# Patient Record
Sex: FEMALE | Race: BLACK OR AFRICAN AMERICAN | NOT HISPANIC OR LATINO | Employment: OTHER | URBAN - METROPOLITAN AREA
[De-identification: names, ages, dates, MRNs, and addresses within clinical notes are randomized per-mention and may not be internally consistent; named-entity substitution may affect disease eponyms.]

---

## 2017-01-01 ENCOUNTER — APPOINTMENT (OUTPATIENT)
Dept: GENERAL RADIOLOGY | Facility: HOSPITAL | Age: 82
End: 2017-01-01

## 2017-01-01 ENCOUNTER — APPOINTMENT (OUTPATIENT)
Dept: CT IMAGING | Facility: HOSPITAL | Age: 82
End: 2017-01-01

## 2017-01-01 ENCOUNTER — HOSPITAL ENCOUNTER (INPATIENT)
Facility: HOSPITAL | Age: 82
LOS: 1 days | End: 2017-04-05
Attending: EMERGENCY MEDICINE | Admitting: INTERNAL MEDICINE

## 2017-01-01 ENCOUNTER — HOSPITAL ENCOUNTER (INPATIENT)
Facility: HOSPITAL | Age: 82
LOS: 4 days | Discharge: NURSING, REHAB OR LONG-TERM CARE | End: 2017-03-15
Attending: EMERGENCY MEDICINE | Admitting: INTERNAL MEDICINE

## 2017-01-01 VITALS
BODY MASS INDEX: 24.67 KG/M2 | RESPIRATION RATE: 18 BRPM | DIASTOLIC BLOOD PRESSURE: 87 MMHG | OXYGEN SATURATION: 93 % | HEIGHT: 62 IN | TEMPERATURE: 98.6 F | HEART RATE: 79 BPM | SYSTOLIC BLOOD PRESSURE: 147 MMHG | WEIGHT: 134.04 LBS

## 2017-01-01 VITALS
DIASTOLIC BLOOD PRESSURE: 83 MMHG | HEART RATE: 37 BPM | WEIGHT: 150 LBS | RESPIRATION RATE: 31 BRPM | TEMPERATURE: 96 F | BODY MASS INDEX: 22.73 KG/M2 | SYSTOLIC BLOOD PRESSURE: 116 MMHG | HEIGHT: 68 IN | OXYGEN SATURATION: 91 %

## 2017-01-01 DIAGNOSIS — N17.9 ACUTE RENAL FAILURE, UNSPECIFIED ACUTE RENAL FAILURE TYPE (HCC): ICD-10-CM

## 2017-01-01 DIAGNOSIS — F03.90 DEMENTIA WITHOUT BEHAVIORAL DISTURBANCE, UNSPECIFIED DEMENTIA TYPE: ICD-10-CM

## 2017-01-01 DIAGNOSIS — N17.9 ACUTE KIDNEY INJURY (HCC): ICD-10-CM

## 2017-01-01 DIAGNOSIS — J96.00 ACUTE RESPIRATORY FAILURE, UNSPECIFIED WHETHER WITH HYPOXIA OR HYPERCAPNIA (HCC): ICD-10-CM

## 2017-01-01 DIAGNOSIS — I95.9 HYPOTENSION, UNSPECIFIED HYPOTENSION TYPE: ICD-10-CM

## 2017-01-01 DIAGNOSIS — R41.82 ALTERED MENTAL STATUS, UNSPECIFIED ALTERED MENTAL STATUS TYPE: ICD-10-CM

## 2017-01-01 DIAGNOSIS — N39.0 URINARY TRACT INFECTION, SITE UNSPECIFIED: Primary | ICD-10-CM

## 2017-01-01 DIAGNOSIS — S71.101A: ICD-10-CM

## 2017-01-01 DIAGNOSIS — D64.9 ANEMIA, UNSPECIFIED TYPE: ICD-10-CM

## 2017-01-01 DIAGNOSIS — R79.89 ELEVATED LFTS: ICD-10-CM

## 2017-01-01 DIAGNOSIS — A41.9 SEPSIS, DUE TO UNSPECIFIED ORGANISM: ICD-10-CM

## 2017-01-01 DIAGNOSIS — Z74.09 DECREASED AMBULATION STATUS: ICD-10-CM

## 2017-01-01 DIAGNOSIS — A41.9 SEPSIS, DUE TO UNSPECIFIED ORGANISM: Primary | ICD-10-CM

## 2017-01-01 LAB
ABO + RH BLD: NORMAL
ABO + RH BLD: NORMAL
ABO GROUP BLD: NORMAL
ALBUMIN SERPL-MCNC: 2.2 G/DL (ref 3.5–5.2)
ALBUMIN SERPL-MCNC: 3.3 G/DL (ref 3.5–5.2)
ALBUMIN/GLOB SERPL: 0.7 G/DL
ALBUMIN/GLOB SERPL: 1.1 G/DL
ALP SERPL-CCNC: 115 U/L (ref 39–117)
ALP SERPL-CCNC: 89 U/L (ref 39–117)
ALT SERPL W P-5'-P-CCNC: 27 U/L (ref 1–33)
ALT SERPL W P-5'-P-CCNC: 635 U/L (ref 1–33)
ANION GAP SERPL CALCULATED.3IONS-SCNC: 12.9 MMOL/L
ANION GAP SERPL CALCULATED.3IONS-SCNC: 13.2 MMOL/L
ANION GAP SERPL CALCULATED.3IONS-SCNC: 14.7 MMOL/L
ANION GAP SERPL CALCULATED.3IONS-SCNC: 20.6 MMOL/L
ANION GAP SERPL CALCULATED.3IONS-SCNC: 20.8 MMOL/L
ANION GAP SERPL CALCULATED.3IONS-SCNC: 27.4 MMOL/L
ANISOCYTOSIS BLD QL: NORMAL
ARTERIAL PATENCY WRIST A: ABNORMAL
AST SERPL-CCNC: 29 U/L (ref 1–32)
AST SERPL-CCNC: 737 U/L (ref 1–32)
ATMOSPHERIC PRESS: 743.8 MMHG
B PERT DNA SPEC QL NAA+PROBE: NOT DETECTED
BACTERIA SPEC AEROBE CULT: ABNORMAL
BACTERIA SPEC AEROBE CULT: ABNORMAL
BACTERIA SPEC AEROBE CULT: NO GROWTH
BACTERIA UR QL AUTO: ABNORMAL /HPF
BACTERIA UR QL AUTO: ABNORMAL /HPF
BASE EXCESS BLDA CALC-SCNC: -12.5 MMOL/L (ref 0–2)
BASOPHILS # BLD AUTO: 0.02 10*3/MM3 (ref 0–0.2)
BASOPHILS # BLD AUTO: 0.02 10*3/MM3 (ref 0–0.2)
BASOPHILS # BLD AUTO: 0.03 10*3/MM3 (ref 0–0.2)
BASOPHILS NFR BLD AUTO: 0.1 % (ref 0–1.5)
BASOPHILS NFR BLD AUTO: 0.2 % (ref 0–1.5)
BASOPHILS NFR BLD AUTO: 0.2 % (ref 0–1.5)
BDY SITE: ABNORMAL
BH BB BLOOD EXPIRATION DATE: NORMAL
BH BB BLOOD EXPIRATION DATE: NORMAL
BH BB BLOOD TYPE BARCODE: 5100
BH BB BLOOD TYPE BARCODE: 5100
BH BB DISPENSE STATUS: NORMAL
BH BB DISPENSE STATUS: NORMAL
BH BB PRODUCT CODE: NORMAL
BH BB PRODUCT CODE: NORMAL
BH BB UNIT NUMBER: NORMAL
BH BB UNIT NUMBER: NORMAL
BILIRUB SERPL-MCNC: 0.7 MG/DL (ref 0.1–1.2)
BILIRUB SERPL-MCNC: 0.7 MG/DL (ref 0.1–1.2)
BILIRUB UR QL STRIP: NEGATIVE
BILIRUB UR QL STRIP: NEGATIVE
BLD GP AB SCN SERPL QL: NEGATIVE
BUN BLD-MCNC: 15 MG/DL (ref 8–23)
BUN BLD-MCNC: 20 MG/DL (ref 8–23)
BUN BLD-MCNC: 35 MG/DL (ref 8–23)
BUN BLD-MCNC: 36 MG/DL (ref 8–23)
BUN BLD-MCNC: 43 MG/DL (ref 8–23)
BUN BLD-MCNC: 43 MG/DL (ref 8–23)
BUN/CREAT SERPL: 14.6 (ref 7–25)
BUN/CREAT SERPL: 15.2 (ref 7–25)
BUN/CREAT SERPL: 16.9 (ref 7–25)
BUN/CREAT SERPL: 20 (ref 7–25)
BUN/CREAT SERPL: 20.1 (ref 7–25)
BUN/CREAT SERPL: 28.9 (ref 7–25)
BURR CELLS BLD QL SMEAR: NORMAL
C PNEUM DNA NPH QL NAA+NON-PROBE: NOT DETECTED
CALCIUM SPEC-SCNC: 6.6 MG/DL (ref 8.6–10.5)
CALCIUM SPEC-SCNC: 8 MG/DL (ref 8.6–10.5)
CALCIUM SPEC-SCNC: 8.4 MG/DL (ref 8.6–10.5)
CALCIUM SPEC-SCNC: 8.6 MG/DL (ref 8.6–10.5)
CALCIUM SPEC-SCNC: 8.7 MG/DL (ref 8.6–10.5)
CALCIUM SPEC-SCNC: 9.3 MG/DL (ref 8.6–10.5)
CHLORIDE SERPL-SCNC: 103 MMOL/L (ref 98–107)
CHLORIDE SERPL-SCNC: 105 MMOL/L (ref 98–107)
CHLORIDE SERPL-SCNC: 105 MMOL/L (ref 98–107)
CHLORIDE SERPL-SCNC: 114 MMOL/L (ref 98–107)
CHLORIDE SERPL-SCNC: 117 MMOL/L (ref 98–107)
CHLORIDE SERPL-SCNC: 96 MMOL/L (ref 98–107)
CK SERPL-CCNC: 239 U/L (ref 20–180)
CLARITY UR: ABNORMAL
CLARITY UR: ABNORMAL
CLUMPED PLATELETS: PRESENT
CO2 SERPL-SCNC: 11.4 MMOL/L (ref 22–29)
CO2 SERPL-SCNC: 18.3 MMOL/L (ref 22–29)
CO2 SERPL-SCNC: 19.2 MMOL/L (ref 22–29)
CO2 SERPL-SCNC: 20.1 MMOL/L (ref 22–29)
CO2 SERPL-SCNC: 21.8 MMOL/L (ref 22–29)
CO2 SERPL-SCNC: 6.6 MMOL/L (ref 22–29)
COLOR UR: ABNORMAL
COLOR UR: ABNORMAL
CREAT BLD-MCNC: 0.89 MG/DL (ref 0.57–1)
CREAT BLD-MCNC: 1 MG/DL (ref 0.57–1)
CREAT BLD-MCNC: 1.21 MG/DL (ref 0.57–1)
CREAT BLD-MCNC: 2.14 MG/DL (ref 0.57–1)
CREAT BLD-MCNC: 2.46 MG/DL (ref 0.57–1)
CREAT BLD-MCNC: 2.82 MG/DL (ref 0.57–1)
CROSSMATCH INTERPRETATION: NORMAL
CROSSMATCH INTERPRETATION: NORMAL
D-LACTATE SERPL-SCNC: 2.5 MMOL/L (ref 0.5–2)
D-LACTATE SERPL-SCNC: 2.6 MMOL/L (ref 0.5–2)
D-LACTATE SERPL-SCNC: 6.8 MMOL/L (ref 0.5–2)
D-LACTATE SERPL-SCNC: 9.1 MMOL/L (ref 0.5–2)
DEPRECATED RDW RBC AUTO: 51 FL (ref 37–54)
DEPRECATED RDW RBC AUTO: 53.7 FL (ref 37–54)
DEPRECATED RDW RBC AUTO: 54.4 FL (ref 37–54)
DEPRECATED RDW RBC AUTO: 60.1 FL (ref 37–54)
EOSINOPHIL # BLD AUTO: 0.01 10*3/MM3 (ref 0–0.7)
EOSINOPHIL # BLD AUTO: 0.06 10*3/MM3 (ref 0–0.7)
EOSINOPHIL # BLD AUTO: 0.12 10*3/MM3 (ref 0–0.7)
EOSINOPHIL NFR BLD AUTO: 0.1 % (ref 0.3–6.2)
EOSINOPHIL NFR BLD AUTO: 0.5 % (ref 0.3–6.2)
EOSINOPHIL NFR BLD AUTO: 1.3 % (ref 0.3–6.2)
ERYTHROCYTE [DISTWIDTH] IN BLOOD BY AUTOMATED COUNT: 15.5 % (ref 11.7–13)
ERYTHROCYTE [DISTWIDTH] IN BLOOD BY AUTOMATED COUNT: 15.6 % (ref 11.7–13)
ERYTHROCYTE [DISTWIDTH] IN BLOOD BY AUTOMATED COUNT: 15.9 % (ref 11.7–13)
ERYTHROCYTE [DISTWIDTH] IN BLOOD BY AUTOMATED COUNT: 16.3 % (ref 11.7–13)
FERRITIN SERPL-MCNC: 353.7 NG/ML (ref 13–150)
FLUAV H1 2009 PAND RNA NPH QL NAA+PROBE: NOT DETECTED
FLUAV H1 HA GENE NPH QL NAA+PROBE: NOT DETECTED
FLUAV H3 RNA NPH QL NAA+PROBE: NOT DETECTED
FLUAV SUBTYP SPEC NAA+PROBE: NOT DETECTED
FLUBV RNA ISLT QL NAA+PROBE: NOT DETECTED
FOLATE SERPL-MCNC: 10.59 NG/ML (ref 4.78–24.2)
GFR SERPL CREATININE-BSD FRML MDRD: 19 ML/MIN/1.73
GFR SERPL CREATININE-BSD FRML MDRD: 23 ML/MIN/1.73
GFR SERPL CREATININE-BSD FRML MDRD: 27 ML/MIN/1.73
GFR SERPL CREATININE-BSD FRML MDRD: 52 ML/MIN/1.73
GFR SERPL CREATININE-BSD FRML MDRD: 64 ML/MIN/1.73
GFR SERPL CREATININE-BSD FRML MDRD: 73 ML/MIN/1.73
GLOBULIN UR ELPH-MCNC: 3 GM/DL
GLOBULIN UR ELPH-MCNC: 3.2 GM/DL
GLUCOSE BLD-MCNC: 124 MG/DL (ref 65–99)
GLUCOSE BLD-MCNC: 125 MG/DL (ref 65–99)
GLUCOSE BLD-MCNC: 138 MG/DL (ref 65–99)
GLUCOSE BLD-MCNC: 178 MG/DL (ref 65–99)
GLUCOSE BLD-MCNC: 233 MG/DL (ref 65–99)
GLUCOSE BLD-MCNC: 285 MG/DL (ref 65–99)
GLUCOSE BLDC GLUCOMTR-MCNC: 106 MG/DL (ref 70–130)
GLUCOSE BLDC GLUCOMTR-MCNC: 111 MG/DL (ref 70–130)
GLUCOSE BLDC GLUCOMTR-MCNC: 117 MG/DL (ref 70–130)
GLUCOSE BLDC GLUCOMTR-MCNC: 118 MG/DL (ref 70–130)
GLUCOSE BLDC GLUCOMTR-MCNC: 120 MG/DL (ref 70–130)
GLUCOSE BLDC GLUCOMTR-MCNC: 131 MG/DL (ref 70–130)
GLUCOSE BLDC GLUCOMTR-MCNC: 133 MG/DL (ref 70–130)
GLUCOSE BLDC GLUCOMTR-MCNC: 147 MG/DL (ref 70–130)
GLUCOSE BLDC GLUCOMTR-MCNC: 150 MG/DL (ref 70–130)
GLUCOSE BLDC GLUCOMTR-MCNC: 150 MG/DL (ref 70–130)
GLUCOSE BLDC GLUCOMTR-MCNC: 158 MG/DL (ref 70–130)
GLUCOSE BLDC GLUCOMTR-MCNC: 173 MG/DL (ref 70–130)
GLUCOSE BLDC GLUCOMTR-MCNC: 185 MG/DL (ref 70–130)
GLUCOSE BLDC GLUCOMTR-MCNC: 188 MG/DL (ref 70–130)
GLUCOSE BLDC GLUCOMTR-MCNC: 93 MG/DL (ref 70–130)
GLUCOSE BLDC GLUCOMTR-MCNC: 99 MG/DL (ref 70–130)
GLUCOSE UR STRIP-MCNC: NEGATIVE MG/DL
GLUCOSE UR STRIP-MCNC: NEGATIVE MG/DL
GRAM STN SPEC: ABNORMAL
GRAM STN SPEC: ABNORMAL
HADV DNA SPEC NAA+PROBE: NOT DETECTED
HBA1C MFR BLD: 6.04 % (ref 4.8–5.6)
HCO3 BLDA-SCNC: 12.7 MMOL/L (ref 22–28)
HCOV 229E RNA SPEC QL NAA+PROBE: NOT DETECTED
HCOV HKU1 RNA SPEC QL NAA+PROBE: NOT DETECTED
HCOV NL63 RNA SPEC QL NAA+PROBE: NOT DETECTED
HCOV OC43 RNA SPEC QL NAA+PROBE: NOT DETECTED
HCT VFR BLD AUTO: 23.7 % (ref 35.6–45.5)
HCT VFR BLD AUTO: 27.3 % (ref 35.6–45.5)
HCT VFR BLD AUTO: 27.8 % (ref 35.6–45.5)
HCT VFR BLD AUTO: 32.1 % (ref 35.6–45.5)
HGB BLD-MCNC: 10.6 G/DL (ref 11.9–15.5)
HGB BLD-MCNC: 6.7 G/DL (ref 11.9–15.5)
HGB BLD-MCNC: 8.5 G/DL (ref 11.9–15.5)
HGB BLD-MCNC: 8.9 G/DL (ref 11.9–15.5)
HGB UR QL STRIP.AUTO: ABNORMAL
HGB UR QL STRIP.AUTO: ABNORMAL
HMPV RNA NPH QL NAA+NON-PROBE: NOT DETECTED
HOROWITZ INDEX BLD+IHG-RTO: 100 %
HPIV1 RNA SPEC QL NAA+PROBE: NOT DETECTED
HPIV2 RNA SPEC QL NAA+PROBE: NOT DETECTED
HPIV3 RNA NPH QL NAA+PROBE: NOT DETECTED
HPIV4 P GENE NPH QL NAA+PROBE: NOT DETECTED
HYALINE CASTS UR QL AUTO: ABNORMAL /LPF
HYALINE CASTS UR QL AUTO: ABNORMAL /LPF
IMM GRANULOCYTES # BLD: 0.1 10*3/MM3 (ref 0–0.03)
IMM GRANULOCYTES # BLD: 0.32 10*3/MM3 (ref 0–0.03)
IMM GRANULOCYTES # BLD: 1 10*3/MM3 (ref 0–0.03)
IMM GRANULOCYTES NFR BLD: 1.1 % (ref 0–0.5)
IMM GRANULOCYTES NFR BLD: 2.5 % (ref 0–0.5)
IMM GRANULOCYTES NFR BLD: 5.5 % (ref 0–0.5)
INR PPP: 1.24 (ref 0.9–1.1)
IRON 24H UR-MRATE: 16 MCG/DL (ref 37–145)
IRON SATN MFR SERPL: 15 % (ref 20–50)
KETONES UR QL STRIP: ABNORMAL
KETONES UR QL STRIP: ABNORMAL
LEUKOCYTE ESTERASE UR QL STRIP.AUTO: ABNORMAL
LEUKOCYTE ESTERASE UR QL STRIP.AUTO: ABNORMAL
LYMPHOCYTES # BLD AUTO: 1.14 10*3/MM3 (ref 0.9–4.8)
LYMPHOCYTES # BLD AUTO: 1.17 10*3/MM3 (ref 0.9–4.8)
LYMPHOCYTES # BLD AUTO: 1.29 10*3/MM3 (ref 0.9–4.8)
LYMPHOCYTES NFR BLD AUTO: 10.1 % (ref 19.6–45.3)
LYMPHOCYTES NFR BLD AUTO: 12.8 % (ref 19.6–45.3)
LYMPHOCYTES NFR BLD AUTO: 6.5 % (ref 19.6–45.3)
M PNEUMO IGG SER IA-ACNC: NOT DETECTED
MACROCYTES BLD QL SMEAR: NORMAL
MAGNESIUM SERPL-MCNC: 2.1 MG/DL (ref 1.6–2.4)
MCH RBC QN AUTO: 28.6 PG (ref 26.9–32)
MCH RBC QN AUTO: 29.3 PG (ref 26.9–32)
MCH RBC QN AUTO: 29.4 PG (ref 26.9–32)
MCH RBC QN AUTO: 30.3 PG (ref 26.9–32)
MCHC RBC AUTO-ENTMCNC: 28.3 G/DL (ref 32.4–36.3)
MCHC RBC AUTO-ENTMCNC: 30.6 G/DL (ref 32.4–36.3)
MCHC RBC AUTO-ENTMCNC: 32.6 G/DL (ref 32.4–36.3)
MCHC RBC AUTO-ENTMCNC: 33 G/DL (ref 32.4–36.3)
MCV RBC AUTO: 101.3 FL (ref 80.5–98.2)
MCV RBC AUTO: 88.9 FL (ref 80.5–98.2)
MCV RBC AUTO: 92.9 FL (ref 80.5–98.2)
MCV RBC AUTO: 95.9 FL (ref 80.5–98.2)
MODALITY: ABNORMAL
MONOCYTES # BLD AUTO: 0.64 10*3/MM3 (ref 0.2–1.2)
MONOCYTES # BLD AUTO: 0.68 10*3/MM3 (ref 0.2–1.2)
MONOCYTES # BLD AUTO: 0.75 10*3/MM3 (ref 0.2–1.2)
MONOCYTES NFR BLD AUTO: 3.8 % (ref 5–12)
MONOCYTES NFR BLD AUTO: 5 % (ref 5–12)
MONOCYTES NFR BLD AUTO: 8.4 % (ref 5–12)
NEUTROPHILS # BLD AUTO: 10.42 10*3/MM3 (ref 1.9–8.1)
NEUTROPHILS # BLD AUTO: 15.14 10*3/MM3 (ref 1.9–8.1)
NEUTROPHILS # BLD AUTO: 6.76 10*3/MM3 (ref 1.9–8.1)
NEUTROPHILS NFR BLD AUTO: 76.2 % (ref 42.7–76)
NEUTROPHILS NFR BLD AUTO: 81.7 % (ref 42.7–76)
NEUTROPHILS NFR BLD AUTO: 84 % (ref 42.7–76)
NITRITE UR QL STRIP: NEGATIVE
NITRITE UR QL STRIP: NEGATIVE
NRBC BLD MANUAL-RTO: 0 /100 WBC (ref 0–0)
NRBC BLD MANUAL-RTO: 0.1 /100 WBC (ref 0–0)
NT-PROBNP SERPL-MCNC: 2096 PG/ML (ref 0–1800)
O2 A-A PPRESDIFF RESPIRATORY: 0.8 MMHG
PCO2 BLDA: 26.4 MM HG (ref 35–45)
PEEP RESPIRATORY: 5 CM[H2O]
PH BLDA: 7.29 PH UNITS (ref 7.35–7.45)
PH UR STRIP.AUTO: 5.5 [PH] (ref 5–8)
PH UR STRIP.AUTO: <=5 [PH] (ref 5–8)
PLAT MORPH BLD: NORMAL
PLAT MORPH BLD: NORMAL
PLATELET # BLD AUTO: 357 10*3/MM3 (ref 140–500)
PLATELET # BLD AUTO: 370 10*3/MM3 (ref 140–500)
PLATELET # BLD AUTO: 438 10*3/MM3 (ref 140–500)
PLATELET # BLD AUTO: 469 10*3/MM3 (ref 140–500)
PMV BLD AUTO: 9.5 FL (ref 6–12)
PMV BLD AUTO: 9.5 FL (ref 6–12)
PMV BLD AUTO: 9.6 FL (ref 6–12)
PMV BLD AUTO: 9.6 FL (ref 6–12)
PO2 BLDA: 562.3 MM HG (ref 80–100)
POTASSIUM BLD-SCNC: 4 MMOL/L (ref 3.5–5.2)
POTASSIUM BLD-SCNC: 4.2 MMOL/L (ref 3.5–5.2)
POTASSIUM BLD-SCNC: 4.6 MMOL/L (ref 3.5–5.2)
POTASSIUM BLD-SCNC: 5.2 MMOL/L (ref 3.5–5.2)
POTASSIUM BLD-SCNC: 5.3 MMOL/L (ref 3.5–5.2)
POTASSIUM BLD-SCNC: 5.3 MMOL/L (ref 3.5–5.2)
PROCALCITONIN SERPL-MCNC: 0.96 NG/ML (ref 0.1–0.25)
PROT SERPL-MCNC: 5.4 G/DL (ref 6–8.5)
PROT SERPL-MCNC: 6.3 G/DL (ref 6–8.5)
PROT UR QL STRIP: ABNORMAL
PROT UR QL STRIP: ABNORMAL
PROTHROMBIN TIME: 15.1 SECONDS (ref 11.7–14.2)
RBC # BLD AUTO: 2.34 10*6/MM3 (ref 3.9–5.2)
RBC # BLD AUTO: 2.9 10*6/MM3 (ref 3.9–5.2)
RBC # BLD AUTO: 2.94 10*6/MM3 (ref 3.9–5.2)
RBC # BLD AUTO: 3.61 10*6/MM3 (ref 3.9–5.2)
RBC # UR: ABNORMAL /HPF
RBC # UR: ABNORMAL /HPF
REF LAB TEST METHOD: ABNORMAL
REF LAB TEST METHOD: ABNORMAL
RH BLD: POSITIVE
RHINOVIRUS RNA SPEC NAA+PROBE: NOT DETECTED
RSV RNA NPH QL NAA+NON-PROBE: NOT DETECTED
SAO2 % BLDCOA: 100 % (ref 92–99)
SCHISTOCYTES BLD QL SMEAR: NORMAL
SET MECH RESP RATE: 12
SODIUM BLD-SCNC: 136 MMOL/L (ref 136–145)
SODIUM BLD-SCNC: 138 MMOL/L (ref 136–145)
SODIUM BLD-SCNC: 148 MMOL/L (ref 136–145)
SODIUM BLD-SCNC: 149 MMOL/L (ref 136–145)
SP GR UR STRIP: 1.02 (ref 1–1.03)
SP GR UR STRIP: 1.02 (ref 1–1.03)
SPHEROCYTES BLD QL SMEAR: NORMAL
SPHEROCYTES BLD QL SMEAR: NORMAL
SQUAMOUS #/AREA URNS HPF: ABNORMAL /HPF
SQUAMOUS #/AREA URNS HPF: ABNORMAL /HPF
TIBC SERPL-MCNC: 107 MCG/DL (ref 298–536)
TOTAL RATE: 17 BREATHS/MINUTE
TRANSFERRIN SERPL-MCNC: 72 MG/DL (ref 200–360)
TROPONIN T SERPL-MCNC: 0.04 NG/ML (ref 0–0.03)
TROPONIN T SERPL-MCNC: 0.04 NG/ML (ref 0–0.03)
TROPONIN T SERPL-MCNC: 0.05 NG/ML (ref 0–0.03)
TROPONIN T SERPL-MCNC: 0.37 NG/ML (ref 0–0.03)
TROPONIN T SERPL-MCNC: 0.41 NG/ML (ref 0–0.03)
TSH SERPL DL<=0.05 MIU/L-ACNC: 8.25 MIU/ML (ref 0.27–4.2)
UNIT  ABO: NORMAL
UNIT  ABO: NORMAL
UNIT  RH: NORMAL
UNIT  RH: NORMAL
UROBILINOGEN UR QL STRIP: ABNORMAL
UROBILINOGEN UR QL STRIP: ABNORMAL
VENTILATOR MODE: ABNORMAL
VIT B12 BLD-MCNC: 196 PG/ML (ref 211–946)
VT ON VENT VENT: 550 ML
WBC MORPH BLD: NORMAL
WBC NRBC COR # BLD: 10.55 10*3/MM3 (ref 4.5–10.7)
WBC NRBC COR # BLD: 12.76 10*3/MM3 (ref 4.5–10.7)
WBC NRBC COR # BLD: 18.02 10*3/MM3 (ref 4.5–10.7)
WBC NRBC COR # BLD: 8.89 10*3/MM3 (ref 4.5–10.7)
WBC UR QL AUTO: ABNORMAL /HPF
WBC UR QL AUTO: ABNORMAL /HPF
YEAST URNS QL MICRO: ABNORMAL /HPF

## 2017-01-01 PROCEDURE — 94799 UNLISTED PULMONARY SVC/PX: CPT

## 2017-01-01 PROCEDURE — 87077 CULTURE AEROBIC IDENTIFY: CPT | Performed by: INTERNAL MEDICINE

## 2017-01-01 PROCEDURE — 83605 ASSAY OF LACTIC ACID: CPT | Performed by: EMERGENCY MEDICINE

## 2017-01-01 PROCEDURE — 83540 ASSAY OF IRON: CPT | Performed by: HOSPITALIST

## 2017-01-01 PROCEDURE — 97110 THERAPEUTIC EXERCISES: CPT

## 2017-01-01 PROCEDURE — 25010000002 CEFTRIAXONE PER 250 MG: Performed by: HOSPITALIST

## 2017-01-01 PROCEDURE — 5A1935Z RESPIRATORY VENTILATION, LESS THAN 24 CONSECUTIVE HOURS: ICD-10-PCS | Performed by: INTERNAL MEDICINE

## 2017-01-01 PROCEDURE — 82746 ASSAY OF FOLIC ACID SERUM: CPT | Performed by: HOSPITALIST

## 2017-01-01 PROCEDURE — 87186 SC STD MICRODIL/AGAR DIL: CPT | Performed by: INTERNAL MEDICINE

## 2017-01-01 PROCEDURE — 25010000003 CEFTRIAXONE PER 250 MG: Performed by: HOSPITALIST

## 2017-01-01 PROCEDURE — 84443 ASSAY THYROID STIM HORMONE: CPT | Performed by: HOSPITALIST

## 2017-01-01 PROCEDURE — 25010000002 FENTANYL CITRATE (PF) 100 MCG/2ML SOLUTION: Performed by: INTERNAL MEDICINE

## 2017-01-01 PROCEDURE — 82962 GLUCOSE BLOOD TEST: CPT

## 2017-01-01 PROCEDURE — 36415 COLL VENOUS BLD VENIPUNCTURE: CPT | Performed by: EMERGENCY MEDICINE

## 2017-01-01 PROCEDURE — 84484 ASSAY OF TROPONIN QUANT: CPT | Performed by: EMERGENCY MEDICINE

## 2017-01-01 PROCEDURE — 05HM33Z INSERTION OF INFUSION DEVICE INTO RIGHT INTERNAL JUGULAR VEIN, PERCUTANEOUS APPROACH: ICD-10-PCS | Performed by: INTERNAL MEDICINE

## 2017-01-01 PROCEDURE — 92526 ORAL FUNCTION THERAPY: CPT

## 2017-01-01 PROCEDURE — 82803 BLOOD GASES ANY COMBINATION: CPT

## 2017-01-01 PROCEDURE — 25010000002 ENOXAPARIN PER 10 MG: Performed by: INTERNAL MEDICINE

## 2017-01-01 PROCEDURE — 63710000001 INSULIN ASPART PER 5 UNITS: Performed by: INTERNAL MEDICINE

## 2017-01-01 PROCEDURE — 81001 URINALYSIS AUTO W/SCOPE: CPT | Performed by: EMERGENCY MEDICINE

## 2017-01-01 PROCEDURE — 94003 VENT MGMT INPAT SUBQ DAY: CPT

## 2017-01-01 PROCEDURE — 83735 ASSAY OF MAGNESIUM: CPT | Performed by: INTERNAL MEDICINE

## 2017-01-01 PROCEDURE — 30233N1 TRANSFUSION OF NONAUTOLOGOUS RED BLOOD CELLS INTO PERIPHERAL VEIN, PERCUTANEOUS APPROACH: ICD-10-PCS | Performed by: EMERGENCY MEDICINE

## 2017-01-01 PROCEDURE — 82607 VITAMIN B-12: CPT | Performed by: HOSPITALIST

## 2017-01-01 PROCEDURE — P9016 RBC LEUKOCYTES REDUCED: HCPCS

## 2017-01-01 PROCEDURE — 82728 ASSAY OF FERRITIN: CPT | Performed by: HOSPITALIST

## 2017-01-01 PROCEDURE — 84466 ASSAY OF TRANSFERRIN: CPT | Performed by: HOSPITALIST

## 2017-01-01 PROCEDURE — 36430 TRANSFUSION BLD/BLD COMPNT: CPT

## 2017-01-01 PROCEDURE — 93010 ELECTROCARDIOGRAM REPORT: CPT | Performed by: INTERNAL MEDICINE

## 2017-01-01 PROCEDURE — 87798 DETECT AGENT NOS DNA AMP: CPT | Performed by: INTERNAL MEDICINE

## 2017-01-01 PROCEDURE — 99291 CRITICAL CARE FIRST HOUR: CPT

## 2017-01-01 PROCEDURE — 80048 BASIC METABOLIC PNL TOTAL CA: CPT | Performed by: HOSPITALIST

## 2017-01-01 PROCEDURE — 71010 HC CHEST PA OR AP: CPT

## 2017-01-01 PROCEDURE — 87040 BLOOD CULTURE FOR BACTERIA: CPT | Performed by: EMERGENCY MEDICINE

## 2017-01-01 PROCEDURE — 80053 COMPREHEN METABOLIC PANEL: CPT | Performed by: EMERGENCY MEDICINE

## 2017-01-01 PROCEDURE — 86920 COMPATIBILITY TEST SPIN: CPT

## 2017-01-01 PROCEDURE — 87086 URINE CULTURE/COLONY COUNT: CPT | Performed by: EMERGENCY MEDICINE

## 2017-01-01 PROCEDURE — 85007 BL SMEAR W/DIFF WBC COUNT: CPT | Performed by: HOSPITALIST

## 2017-01-01 PROCEDURE — 85007 BL SMEAR W/DIFF WBC COUNT: CPT | Performed by: EMERGENCY MEDICINE

## 2017-01-01 PROCEDURE — 25010000002 CYANOCOBALAMIN PER 1000 MCG: Performed by: HOSPITALIST

## 2017-01-01 PROCEDURE — 25010000002 PIPERACILLIN SOD-TAZOBACTAM PER 1 G: Performed by: INTERNAL MEDICINE

## 2017-01-01 PROCEDURE — 83605 ASSAY OF LACTIC ACID: CPT | Performed by: INTERNAL MEDICINE

## 2017-01-01 PROCEDURE — 85027 COMPLETE CBC AUTOMATED: CPT | Performed by: INTERNAL MEDICINE

## 2017-01-01 PROCEDURE — 84484 ASSAY OF TROPONIN QUANT: CPT | Performed by: HOSPITALIST

## 2017-01-01 PROCEDURE — 93005 ELECTROCARDIOGRAM TRACING: CPT | Performed by: EMERGENCY MEDICINE

## 2017-01-01 PROCEDURE — 99232 SBSQ HOSP IP/OBS MODERATE 35: CPT | Performed by: PSYCHIATRY & NEUROLOGY

## 2017-01-01 PROCEDURE — 87633 RESP VIRUS 12-25 TARGETS: CPT | Performed by: INTERNAL MEDICINE

## 2017-01-01 PROCEDURE — 99285 EMERGENCY DEPT VISIT HI MDM: CPT

## 2017-01-01 PROCEDURE — 80048 BASIC METABOLIC PNL TOTAL CA: CPT | Performed by: INTERNAL MEDICINE

## 2017-01-01 PROCEDURE — 86850 RBC ANTIBODY SCREEN: CPT | Performed by: EMERGENCY MEDICINE

## 2017-01-01 PROCEDURE — 87205 SMEAR GRAM STAIN: CPT | Performed by: INTERNAL MEDICINE

## 2017-01-01 PROCEDURE — 97162 PT EVAL MOD COMPLEX 30 MIN: CPT

## 2017-01-01 PROCEDURE — 86900 BLOOD TYPING SEROLOGIC ABO: CPT

## 2017-01-01 PROCEDURE — 87486 CHLMYD PNEUM DNA AMP PROBE: CPT | Performed by: INTERNAL MEDICINE

## 2017-01-01 PROCEDURE — B543ZZA ULTRASONOGRAPHY OF RIGHT JUGULAR VEINS, GUIDANCE: ICD-10-PCS | Performed by: INTERNAL MEDICINE

## 2017-01-01 PROCEDURE — P9612 CATHETERIZE FOR URINE SPEC: HCPCS

## 2017-01-01 PROCEDURE — 87070 CULTURE OTHR SPECIMN AEROBIC: CPT | Performed by: INTERNAL MEDICINE

## 2017-01-01 PROCEDURE — 83036 HEMOGLOBIN GLYCOSYLATED A1C: CPT | Performed by: INTERNAL MEDICINE

## 2017-01-01 PROCEDURE — 93005 ELECTROCARDIOGRAM TRACING: CPT | Performed by: INTERNAL MEDICINE

## 2017-01-01 PROCEDURE — 92610 EVALUATE SWALLOWING FUNCTION: CPT

## 2017-01-01 PROCEDURE — 84145 PROCALCITONIN (PCT): CPT | Performed by: EMERGENCY MEDICINE

## 2017-01-01 PROCEDURE — 25010000003 CEFTRIAXONE PER 250 MG: Performed by: EMERGENCY MEDICINE

## 2017-01-01 PROCEDURE — 85025 COMPLETE CBC W/AUTO DIFF WBC: CPT | Performed by: EMERGENCY MEDICINE

## 2017-01-01 PROCEDURE — 85610 PROTHROMBIN TIME: CPT | Performed by: EMERGENCY MEDICINE

## 2017-01-01 PROCEDURE — 25010000002 PIPERACILLIN SOD-TAZOBACTAM PER 1 G: Performed by: EMERGENCY MEDICINE

## 2017-01-01 PROCEDURE — 86901 BLOOD TYPING SEROLOGIC RH(D): CPT | Performed by: EMERGENCY MEDICINE

## 2017-01-01 PROCEDURE — 86901 BLOOD TYPING SEROLOGIC RH(D): CPT

## 2017-01-01 PROCEDURE — 94002 VENT MGMT INPAT INIT DAY: CPT

## 2017-01-01 PROCEDURE — 84484 ASSAY OF TROPONIN QUANT: CPT | Performed by: INTERNAL MEDICINE

## 2017-01-01 PROCEDURE — 85025 COMPLETE CBC W/AUTO DIFF WBC: CPT | Performed by: HOSPITALIST

## 2017-01-01 PROCEDURE — 70450 CT HEAD/BRAIN W/O DYE: CPT

## 2017-01-01 PROCEDURE — 25010000002 PROPOFOL 1000 MG/ML EMULSION: Performed by: INTERNAL MEDICINE

## 2017-01-01 PROCEDURE — 99292 CRITICAL CARE ADDL 30 MIN: CPT

## 2017-01-01 PROCEDURE — 25010000002 VANCOMYCIN: Performed by: INTERNAL MEDICINE

## 2017-01-01 PROCEDURE — 36600 WITHDRAWAL OF ARTERIAL BLOOD: CPT

## 2017-01-01 PROCEDURE — 86900 BLOOD TYPING SEROLOGIC ABO: CPT | Performed by: EMERGENCY MEDICINE

## 2017-01-01 PROCEDURE — 87581 M.PNEUMON DNA AMP PROBE: CPT | Performed by: INTERNAL MEDICINE

## 2017-01-01 PROCEDURE — 81001 URINALYSIS AUTO W/SCOPE: CPT | Performed by: INTERNAL MEDICINE

## 2017-01-01 PROCEDURE — 83880 ASSAY OF NATRIURETIC PEPTIDE: CPT | Performed by: EMERGENCY MEDICINE

## 2017-01-01 PROCEDURE — 82550 ASSAY OF CK (CPK): CPT | Performed by: EMERGENCY MEDICINE

## 2017-01-01 RX ORDER — ONDANSETRON 2 MG/ML
4 INJECTION INTRAMUSCULAR; INTRAVENOUS EVERY 6 HOURS PRN
Status: DISCONTINUED | OUTPATIENT
Start: 2017-01-01 | End: 2017-01-01 | Stop reason: HOSPADM

## 2017-01-01 RX ORDER — FAMOTIDINE 20 MG/1
20 TABLET, FILM COATED ORAL 2 TIMES DAILY
Status: DISCONTINUED | OUTPATIENT
Start: 2017-01-01 | End: 2017-01-01 | Stop reason: HOSPADM

## 2017-01-01 RX ORDER — HYDROCODONE BITARTRATE AND ACETAMINOPHEN 5; 325 MG/1; MG/1
2 TABLET ORAL EVERY 4 HOURS PRN
Status: DISCONTINUED | OUTPATIENT
Start: 2017-01-01 | End: 2017-01-01

## 2017-01-01 RX ORDER — HYDROMORPHONE HYDROCHLORIDE 1 MG/ML
0.5 INJECTION, SOLUTION INTRAMUSCULAR; INTRAVENOUS; SUBCUTANEOUS ONCE
Status: DISCONTINUED | OUTPATIENT
Start: 2017-01-01 | End: 2017-01-01

## 2017-01-01 RX ORDER — BISACODYL 5 MG/1
5 TABLET, DELAYED RELEASE ORAL DAILY PRN
Status: DISCONTINUED | OUTPATIENT
Start: 2017-01-01 | End: 2017-01-01 | Stop reason: HOSPADM

## 2017-01-01 RX ORDER — FUROSEMIDE 20 MG/1
20 TABLET ORAL DAILY
COMMUNITY
End: 2017-01-01 | Stop reason: HOSPADM

## 2017-01-01 RX ORDER — ATORVASTATIN CALCIUM 40 MG/1
40 TABLET, FILM COATED ORAL DAILY
Status: DISCONTINUED | OUTPATIENT
Start: 2017-01-01 | End: 2017-01-01 | Stop reason: HOSPADM

## 2017-01-01 RX ORDER — LIDOCAINE HYDROCHLORIDE 10 MG/ML
5 INJECTION, SOLUTION INFILTRATION; PERINEURAL EVERY 24 HOURS
Status: DISCONTINUED | OUTPATIENT
Start: 2017-01-01 | End: 2017-01-01

## 2017-01-01 RX ORDER — LEVOTHYROXINE SODIUM 88 UG/1
88 TABLET ORAL
Status: DISCONTINUED | OUTPATIENT
Start: 2017-01-01 | End: 2017-01-01 | Stop reason: HOSPADM

## 2017-01-01 RX ORDER — NAPROXEN 500 MG/1
500 TABLET ORAL
COMMUNITY

## 2017-01-01 RX ORDER — NITROGLYCERIN 0.4 MG/1
0.4 TABLET SUBLINGUAL
Status: DISCONTINUED | OUTPATIENT
Start: 2017-01-01 | End: 2017-01-01 | Stop reason: HOSPADM

## 2017-01-01 RX ORDER — CALCIUM CARBONATE 200(500)MG
2 TABLET,CHEWABLE ORAL 2 TIMES DAILY PRN
Status: DISCONTINUED | OUTPATIENT
Start: 2017-01-01 | End: 2017-01-01 | Stop reason: HOSPADM

## 2017-01-01 RX ORDER — DEXTROSE MONOHYDRATE 25 G/50ML
25 INJECTION, SOLUTION INTRAVENOUS
Status: DISCONTINUED | OUTPATIENT
Start: 2017-01-01 | End: 2017-01-01 | Stop reason: HOSPADM

## 2017-01-01 RX ORDER — CEFTRIAXONE 2 G/1
2 INJECTION, POWDER, FOR SOLUTION INTRAMUSCULAR; INTRAVENOUS EVERY 24 HOURS
Status: DISCONTINUED | OUTPATIENT
Start: 2017-01-01 | End: 2017-01-01

## 2017-01-01 RX ORDER — NITROGLYCERIN 0.4 MG/1
0.4 TABLET SUBLINGUAL
COMMUNITY

## 2017-01-01 RX ORDER — LIDOCAINE HYDROCHLORIDE 10 MG/ML
4.2 INJECTION, SOLUTION INFILTRATION; PERINEURAL EVERY 24 HOURS
Status: DISCONTINUED | OUTPATIENT
Start: 2017-01-01 | End: 2017-01-01

## 2017-01-01 RX ORDER — GLIPIZIDE 5 MG/1
2.5 TABLET ORAL DAILY
COMMUNITY
End: 2017-01-01 | Stop reason: HOSPADM

## 2017-01-01 RX ORDER — FERROUS SULFATE 325(65) MG
325 TABLET ORAL
COMMUNITY

## 2017-01-01 RX ORDER — SACCHAROMYCES BOULARDII 250 MG
250 CAPSULE ORAL 2 TIMES DAILY
Status: DISCONTINUED | OUTPATIENT
Start: 2017-01-01 | End: 2017-01-01 | Stop reason: HOSPADM

## 2017-01-01 RX ORDER — MAGNESIUM HYDROXIDE/ALUMINUM HYDROXICE/SIMETHICONE 120; 1200; 1200 MG/30ML; MG/30ML; MG/30ML
30 SUSPENSION ORAL EVERY 6 HOURS PRN
Status: DISCONTINUED | OUTPATIENT
Start: 2017-01-01 | End: 2017-01-01 | Stop reason: HOSPADM

## 2017-01-01 RX ORDER — CARVEDILOL 12.5 MG/1
12.5 TABLET ORAL 2 TIMES DAILY WITH MEALS
Status: DISCONTINUED | OUTPATIENT
Start: 2017-01-01 | End: 2017-01-01 | Stop reason: HOSPADM

## 2017-01-01 RX ORDER — SODIUM CHLORIDE 9 MG/ML
1000 INJECTION, SOLUTION INTRAVENOUS ONCE
Status: COMPLETED | OUTPATIENT
Start: 2017-01-01 | End: 2017-01-01

## 2017-01-01 RX ORDER — GLIPIZIDE 5 MG/1
2.5 TABLET ORAL
COMMUNITY

## 2017-01-01 RX ORDER — HYOSCYAMINE SULFATE 16 OZ
1 SOLUTION MISCELLANEOUS
COMMUNITY

## 2017-01-01 RX ORDER — HYDROCODONE BITARTRATE AND ACETAMINOPHEN 5; 325 MG/1; MG/1
1 TABLET ORAL EVERY 6 HOURS PRN
COMMUNITY

## 2017-01-01 RX ORDER — FUROSEMIDE 20 MG/1
20 TABLET ORAL
COMMUNITY

## 2017-01-01 RX ORDER — NICOTINE POLACRILEX 4 MG
15 LOZENGE BUCCAL
Status: DISCONTINUED | OUTPATIENT
Start: 2017-01-01 | End: 2017-01-01 | Stop reason: HOSPADM

## 2017-01-01 RX ORDER — CLOPIDOGREL BISULFATE 75 MG/1
75 TABLET ORAL DAILY
COMMUNITY

## 2017-01-01 RX ORDER — CLOPIDOGREL BISULFATE 75 MG/1
75 TABLET ORAL DAILY
Status: DISCONTINUED | OUTPATIENT
Start: 2017-01-01 | End: 2017-01-01 | Stop reason: HOSPADM

## 2017-01-01 RX ORDER — ONDANSETRON 4 MG/1
4 TABLET, ORALLY DISINTEGRATING ORAL EVERY 6 HOURS PRN
Status: DISCONTINUED | OUTPATIENT
Start: 2017-01-01 | End: 2017-01-01 | Stop reason: HOSPADM

## 2017-01-01 RX ORDER — SODIUM CHLORIDE 9 MG/ML
250 INJECTION, SOLUTION INTRAVENOUS CONTINUOUS
Status: DISCONTINUED | OUTPATIENT
Start: 2017-01-01 | End: 2017-01-01 | Stop reason: HOSPADM

## 2017-01-01 RX ORDER — ACETAMINOPHEN 325 MG/1
650 TABLET ORAL EVERY 4 HOURS PRN
Refills: 0
Start: 2017-01-01

## 2017-01-01 RX ORDER — ASPIRIN 81 MG/1
81 TABLET, CHEWABLE ORAL DAILY
Status: DISCONTINUED | OUTPATIENT
Start: 2017-01-01 | End: 2017-01-01 | Stop reason: HOSPADM

## 2017-01-01 RX ORDER — ONDANSETRON 4 MG/1
4 TABLET, FILM COATED ORAL EVERY 6 HOURS PRN
Status: DISCONTINUED | OUTPATIENT
Start: 2017-01-01 | End: 2017-01-01 | Stop reason: HOSPADM

## 2017-01-01 RX ORDER — BISACODYL 10 MG
10 SUPPOSITORY, RECTAL RECTAL DAILY PRN
Status: DISCONTINUED | OUTPATIENT
Start: 2017-01-01 | End: 2017-01-01 | Stop reason: HOSPADM

## 2017-01-01 RX ORDER — NAPROXEN 500 MG/1
500 TABLET ORAL 2 TIMES DAILY PRN
COMMUNITY
End: 2017-01-01 | Stop reason: HOSPADM

## 2017-01-01 RX ORDER — ASPIRIN 81 MG/1
81 TABLET, CHEWABLE ORAL DAILY
COMMUNITY

## 2017-01-01 RX ORDER — IPRATROPIUM BROMIDE AND ALBUTEROL SULFATE 2.5; .5 MG/3ML; MG/3ML
3 SOLUTION RESPIRATORY (INHALATION) EVERY 6 HOURS PRN
Status: DISCONTINUED | OUTPATIENT
Start: 2017-01-01 | End: 2017-01-01 | Stop reason: HOSPADM

## 2017-01-01 RX ORDER — HYDROCODONE BITARTRATE AND ACETAMINOPHEN 5; 325 MG/1; MG/1
1 TABLET ORAL EVERY 6 HOURS PRN
Status: DISCONTINUED | OUTPATIENT
Start: 2017-01-01 | End: 2017-01-01 | Stop reason: HOSPADM

## 2017-01-01 RX ORDER — LEVOTHYROXINE SODIUM 88 UG/1
88 TABLET ORAL EVERY MORNING
COMMUNITY

## 2017-01-01 RX ORDER — CYANOCOBALAMIN 1000 UG/ML
1000 INJECTION, SOLUTION INTRAMUSCULAR; SUBCUTANEOUS DAILY
Status: DISCONTINUED | OUTPATIENT
Start: 2017-01-01 | End: 2017-01-01

## 2017-01-01 RX ORDER — ASCORBIC ACID 500 MG
500 TABLET ORAL DAILY
COMMUNITY

## 2017-01-01 RX ORDER — HYDROCODONE BITARTRATE AND ACETAMINOPHEN 5; 325 MG/1; MG/1
1 TABLET ORAL EVERY 6 HOURS PRN
Status: DISCONTINUED | OUTPATIENT
Start: 2017-01-01 | End: 2017-01-01

## 2017-01-01 RX ORDER — FENTANYL CITRATE 50 UG/ML
50 INJECTION, SOLUTION INTRAMUSCULAR; INTRAVENOUS
Status: DISCONTINUED | OUTPATIENT
Start: 2017-01-01 | End: 2017-01-01 | Stop reason: HOSPADM

## 2017-01-01 RX ORDER — ACETAMINOPHEN 325 MG/1
650 TABLET ORAL EVERY 4 HOURS PRN
Status: DISCONTINUED | OUTPATIENT
Start: 2017-01-01 | End: 2017-01-01 | Stop reason: HOSPADM

## 2017-01-01 RX ORDER — ACETAMINOPHEN 650 MG/1
650 SUPPOSITORY RECTAL EVERY 4 HOURS PRN
Status: DISCONTINUED | OUTPATIENT
Start: 2017-01-01 | End: 2017-01-01 | Stop reason: HOSPADM

## 2017-01-01 RX ORDER — ISOSORBIDE MONONITRATE 60 MG/1
60 TABLET, EXTENDED RELEASE ORAL DAILY
COMMUNITY

## 2017-01-01 RX ORDER — SODIUM CHLORIDE 0.9 % (FLUSH) 0.9 %
1-10 SYRINGE (ML) INJECTION AS NEEDED
Status: DISCONTINUED | OUTPATIENT
Start: 2017-01-01 | End: 2017-01-01 | Stop reason: HOSPADM

## 2017-01-01 RX ORDER — PANTOPRAZOLE SODIUM 40 MG/10ML
40 INJECTION, POWDER, LYOPHILIZED, FOR SOLUTION INTRAVENOUS
Status: DISCONTINUED | OUTPATIENT
Start: 2017-01-01 | End: 2017-01-01 | Stop reason: HOSPADM

## 2017-01-01 RX ORDER — CASTOR OIL AND BALSAM, PERU 788; 87 MG/G; MG/G
1 OINTMENT TOPICAL 2 TIMES DAILY
COMMUNITY

## 2017-01-01 RX ORDER — CHOLECALCIFEROL (VITAMIN D3) 125 MCG
1000 CAPSULE ORAL DAILY
Status: DISCONTINUED | OUTPATIENT
Start: 2017-01-01 | End: 2017-01-01 | Stop reason: HOSPADM

## 2017-01-01 RX ORDER — RANITIDINE 150 MG/1
150 TABLET ORAL 2 TIMES DAILY
COMMUNITY

## 2017-01-01 RX ORDER — CHOLECALCIFEROL (VITAMIN D3) 125 MCG
1000 CAPSULE ORAL DAILY
Status: DISCONTINUED | OUTPATIENT
Start: 2017-01-01 | End: 2017-01-01

## 2017-01-01 RX ORDER — CASTOR OIL AND BALSAM, PERU 788; 87 MG/G; MG/G
1 OINTMENT TOPICAL EVERY 12 HOURS SCHEDULED
Status: DISCONTINUED | OUTPATIENT
Start: 2017-01-01 | End: 2017-01-01 | Stop reason: HOSPADM

## 2017-01-01 RX ORDER — HYOSCYAMINE SULFATE 16 OZ
1 SOLUTION MISCELLANEOUS
Status: DISCONTINUED | OUTPATIENT
Start: 2017-01-01 | End: 2017-01-01 | Stop reason: HOSPADM

## 2017-01-01 RX ORDER — SODIUM CHLORIDE 9 MG/ML
75 INJECTION, SOLUTION INTRAVENOUS CONTINUOUS
Status: DISCONTINUED | OUTPATIENT
Start: 2017-01-01 | End: 2017-01-01

## 2017-01-01 RX ORDER — CEFTRIAXONE SODIUM 2 G/50ML
2 INJECTION, SOLUTION INTRAVENOUS EVERY 24 HOURS
Status: DISCONTINUED | OUTPATIENT
Start: 2017-01-01 | End: 2017-01-01

## 2017-01-01 RX ORDER — CEFTRIAXONE SODIUM 2 G/50ML
2 INJECTION, SOLUTION INTRAVENOUS EVERY 24 HOURS
Status: DISCONTINUED | OUTPATIENT
Start: 2017-01-01 | End: 2017-01-01 | Stop reason: HOSPADM

## 2017-01-01 RX ORDER — ATORVASTATIN CALCIUM 40 MG/1
40 TABLET, FILM COATED ORAL DAILY
COMMUNITY

## 2017-01-01 RX ORDER — CEFTRIAXONE SODIUM 2 G/50ML
2 INJECTION, SOLUTION INTRAVENOUS ONCE
Status: COMPLETED | OUTPATIENT
Start: 2017-01-01 | End: 2017-01-01

## 2017-01-01 RX ORDER — ISOSORBIDE MONONITRATE 60 MG/1
60 TABLET, EXTENDED RELEASE ORAL DAILY
Status: DISCONTINUED | OUTPATIENT
Start: 2017-01-01 | End: 2017-01-01 | Stop reason: HOSPADM

## 2017-01-01 RX ORDER — LISINOPRIL 40 MG/1
40 TABLET ORAL DAILY
COMMUNITY

## 2017-01-01 RX ORDER — CARVEDILOL 12.5 MG/1
12.5 TABLET ORAL 2 TIMES DAILY WITH MEALS
COMMUNITY

## 2017-01-01 RX ADMIN — LIDOCAINE HYDROCHLORIDE 4.2 ML: 10 INJECTION, SOLUTION INFILTRATION; PERINEURAL at 22:04

## 2017-01-01 RX ADMIN — ISOSORBIDE MONONITRATE 60 MG: 60 TABLET, EXTENDED RELEASE ORAL at 09:31

## 2017-01-01 RX ADMIN — FENTANYL CITRATE 50 MCG: 50 INJECTION INTRAMUSCULAR; INTRAVENOUS at 20:31

## 2017-01-01 RX ADMIN — VANCOMYCIN HYDROCHLORIDE 1250 MG: 1 INJECTION, POWDER, LYOPHILIZED, FOR SOLUTION INTRAVENOUS at 17:45

## 2017-01-01 RX ADMIN — Medication 0.2 MCG/KG/MIN: at 17:02

## 2017-01-01 RX ADMIN — CARVEDILOL 12.5 MG: 12.5 TABLET, FILM COATED ORAL at 10:54

## 2017-01-01 RX ADMIN — ASPIRIN 81 MG: 81 TABLET, CHEWABLE ORAL at 10:55

## 2017-01-01 RX ADMIN — ASPIRIN 81 MG: 81 TABLET, CHEWABLE ORAL at 09:31

## 2017-01-01 RX ADMIN — CASTOR OIL AND BALSAM, PERU 1 APPLICATION: 788; 87 OINTMENT TOPICAL at 14:00

## 2017-01-01 RX ADMIN — HYDROCODONE BITARTRATE AND ACETAMINOPHEN 1 TABLET: 5; 325 TABLET ORAL at 01:19

## 2017-01-01 RX ADMIN — HYDROCODONE BITARTRATE AND ACETAMINOPHEN 1 TABLET: 5; 325 TABLET ORAL at 11:28

## 2017-01-01 RX ADMIN — FAMOTIDINE 20 MG: 20 TABLET, FILM COATED ORAL at 18:46

## 2017-01-01 RX ADMIN — SODIUM CHLORIDE 2040 ML: 9 INJECTION, SOLUTION INTRAVENOUS at 07:18

## 2017-01-01 RX ADMIN — FAMOTIDINE 20 MG: 20 TABLET, FILM COATED ORAL at 11:28

## 2017-01-01 RX ADMIN — SODIUM CHLORIDE 125 ML/HR: 9 INJECTION, SOLUTION INTRAVENOUS at 00:41

## 2017-01-01 RX ADMIN — CASTOR OIL AND BALSAM, PERU 1 APPLICATION: 788; 87 OINTMENT TOPICAL at 21:21

## 2017-01-01 RX ADMIN — FAMOTIDINE 20 MG: 20 TABLET, FILM COATED ORAL at 09:30

## 2017-01-01 RX ADMIN — Medication 0.3 MCG/KG/MIN: at 00:28

## 2017-01-01 RX ADMIN — HYOSCYAMINE SULFATE 1 ML: 16 SOLUTION at 18:39

## 2017-01-01 RX ADMIN — FAMOTIDINE 20 MG: 20 TABLET, FILM COATED ORAL at 23:30

## 2017-01-01 RX ADMIN — ATORVASTATIN CALCIUM 40 MG: 40 TABLET, FILM COATED ORAL at 10:54

## 2017-01-01 RX ADMIN — CARVEDILOL 12.5 MG: 12.5 TABLET, FILM COATED ORAL at 23:30

## 2017-01-01 RX ADMIN — FENTANYL CITRATE 50 MCG: 50 INJECTION INTRAMUSCULAR; INTRAVENOUS at 21:39

## 2017-01-01 RX ADMIN — LEVOTHYROXINE SODIUM 88 MCG: 88 TABLET ORAL at 05:51

## 2017-01-01 RX ADMIN — CYANOCOBALAMIN 1000 MCG: 1000 INJECTION, SOLUTION INTRAMUSCULAR at 18:39

## 2017-01-01 RX ADMIN — SODIUM CHLORIDE 250 ML/HR: 9 INJECTION, SOLUTION INTRAVENOUS at 09:36

## 2017-01-01 RX ADMIN — CASTOR OIL AND BALSAM, PERU 1 APPLICATION: 788; 87 OINTMENT TOPICAL at 12:25

## 2017-01-01 RX ADMIN — Medication 0.12 MCG/KG/MIN: at 09:37

## 2017-01-01 RX ADMIN — ISOSORBIDE MONONITRATE 60 MG: 60 TABLET, EXTENDED RELEASE ORAL at 10:45

## 2017-01-01 RX ADMIN — HYOSCYAMINE SULFATE 1 ML: 16 SOLUTION at 09:31

## 2017-01-01 RX ADMIN — SODIUM CHLORIDE 125 ML/HR: 9 INJECTION, SOLUTION INTRAVENOUS at 12:25

## 2017-01-01 RX ADMIN — PROPOFOL 10 MCG/KG/MIN: 10 INJECTION, EMULSION INTRAVENOUS at 16:54

## 2017-01-01 RX ADMIN — CASTOR OIL AND BALSAM, PERU 1 APPLICATION: 788; 87 OINTMENT TOPICAL at 00:49

## 2017-01-01 RX ADMIN — SODIUM CHLORIDE 125 ML/HR: 9 INJECTION, SOLUTION INTRAVENOUS at 17:26

## 2017-01-01 RX ADMIN — PHENYLEPHRINE HYDROCHLORIDE 3 MCG/KG/MIN: 10 INJECTION INTRAVENOUS at 22:31

## 2017-01-01 RX ADMIN — LEVOTHYROXINE SODIUM 88 MCG: 88 TABLET ORAL at 06:01

## 2017-01-01 RX ADMIN — ASPIRIN 81 MG: 81 TABLET, CHEWABLE ORAL at 10:45

## 2017-01-01 RX ADMIN — Medication 250 MG: at 18:40

## 2017-01-01 RX ADMIN — FENTANYL CITRATE 50 MCG: 50 INJECTION INTRAMUSCULAR; INTRAVENOUS at 15:47

## 2017-01-01 RX ADMIN — CARVEDILOL 12.5 MG: 12.5 TABLET, FILM COATED ORAL at 09:31

## 2017-01-01 RX ADMIN — Medication 1000 MCG: at 09:31

## 2017-01-01 RX ADMIN — HYDROCODONE BITARTATE AND ACETAMINOPHEN 2 TABLET: 5; 325 TABLET ORAL at 13:51

## 2017-01-01 RX ADMIN — HYDROCODONE BITARTRATE AND ACETAMINOPHEN 1 TABLET: 5; 325 TABLET ORAL at 10:55

## 2017-01-01 RX ADMIN — CASTOR OIL AND BALSAM, PERU 1 APPLICATION: 788; 87 OINTMENT TOPICAL at 11:28

## 2017-01-01 RX ADMIN — SODIUM CHLORIDE 250 ML/HR: 9 INJECTION, SOLUTION INTRAVENOUS at 17:02

## 2017-01-01 RX ADMIN — ENOXAPARIN SODIUM 30 MG: 30 INJECTION SUBCUTANEOUS at 09:31

## 2017-01-01 RX ADMIN — VASOPRESSIN 0.1 UNITS/MIN: 20 INJECTION INTRAVENOUS at 01:33

## 2017-01-01 RX ADMIN — Medication 0.3 MCG/KG/MIN: at 18:16

## 2017-01-01 RX ADMIN — CASTOR OIL AND BALSAM, PERU 1 APPLICATION: 788; 87 OINTMENT TOPICAL at 21:05

## 2017-01-01 RX ADMIN — LEVOTHYROXINE SODIUM 88 MCG: 88 TABLET ORAL at 05:26

## 2017-01-01 RX ADMIN — ISOSORBIDE MONONITRATE 60 MG: 60 TABLET, EXTENDED RELEASE ORAL at 11:28

## 2017-01-01 RX ADMIN — LEVOTHYROXINE SODIUM 88 MCG: 88 TABLET ORAL at 05:49

## 2017-01-01 RX ADMIN — CARVEDILOL 12.5 MG: 12.5 TABLET, FILM COATED ORAL at 10:44

## 2017-01-01 RX ADMIN — ATORVASTATIN CALCIUM 40 MG: 40 TABLET, FILM COATED ORAL at 09:31

## 2017-01-01 RX ADMIN — HYOSCYAMINE SULFATE 1 ML: 16 SOLUTION at 12:25

## 2017-01-01 RX ADMIN — TAZOBACTAM SODIUM AND PIPERACILLIN SODIUM 4.5 G: 500; 4 INJECTION, SOLUTION INTRAVENOUS at 07:55

## 2017-01-01 RX ADMIN — FAMOTIDINE 20 MG: 20 TABLET, FILM COATED ORAL at 18:02

## 2017-01-01 RX ADMIN — PHENYLEPHRINE HYDROCHLORIDE 1 MCG/KG/MIN: 10 INJECTION INTRAVENOUS at 17:19

## 2017-01-01 RX ADMIN — CARVEDILOL 12.5 MG: 12.5 TABLET, FILM COATED ORAL at 18:39

## 2017-01-01 RX ADMIN — ENOXAPARIN SODIUM 30 MG: 30 INJECTION SUBCUTANEOUS at 10:55

## 2017-01-01 RX ADMIN — ATORVASTATIN CALCIUM 40 MG: 40 TABLET, FILM COATED ORAL at 10:45

## 2017-01-01 RX ADMIN — HYDROCODONE BITARTRATE AND ACETAMINOPHEN 1 TABLET: 5; 325 TABLET ORAL at 10:44

## 2017-01-01 RX ADMIN — CASTOR OIL AND BALSAM, PERU 1 APPLICATION: 788; 87 OINTMENT TOPICAL at 09:31

## 2017-01-01 RX ADMIN — SODIUM CHLORIDE 1000 ML: 9 INJECTION, SOLUTION INTRAVENOUS at 08:33

## 2017-01-01 RX ADMIN — HYDROCODONE BITARTATE AND ACETAMINOPHEN 2 TABLET: 5; 325 TABLET ORAL at 23:30

## 2017-01-01 RX ADMIN — ATORVASTATIN CALCIUM 40 MG: 40 TABLET, FILM COATED ORAL at 11:28

## 2017-01-01 RX ADMIN — HYDROCODONE BITARTATE AND ACETAMINOPHEN 2 TABLET: 5; 325 TABLET ORAL at 10:54

## 2017-01-01 RX ADMIN — HYOSCYAMINE SULFATE 1 ML: 16 SOLUTION at 14:00

## 2017-01-01 RX ADMIN — CLOPIDOGREL 75 MG: 75 TABLET, FILM COATED ORAL at 10:45

## 2017-01-01 RX ADMIN — CEFTRIAXONE SODIUM 2 G: 2 INJECTION, SOLUTION INTRAVENOUS at 15:26

## 2017-01-01 RX ADMIN — TAZOBACTAM SODIUM AND PIPERACILLIN SODIUM 4.5 G: 500; 4 INJECTION, SOLUTION INTRAVENOUS at 20:35

## 2017-01-01 RX ADMIN — CLOPIDOGREL 75 MG: 75 TABLET, FILM COATED ORAL at 09:31

## 2017-01-01 RX ADMIN — CARVEDILOL 12.5 MG: 12.5 TABLET, FILM COATED ORAL at 18:02

## 2017-01-01 RX ADMIN — ENOXAPARIN SODIUM 30 MG: 30 INJECTION SUBCUTANEOUS at 10:44

## 2017-01-01 RX ADMIN — COLLAGENASE SANTYL 1 APPLICATION: 250 OINTMENT TOPICAL at 18:47

## 2017-01-01 RX ADMIN — FENTANYL CITRATE 50 MCG: 50 INJECTION INTRAMUSCULAR; INTRAVENOUS at 16:49

## 2017-01-01 RX ADMIN — Medication 250 MG: at 10:54

## 2017-01-01 RX ADMIN — CYANOCOBALAMIN 1000 MCG: 1000 INJECTION, SOLUTION INTRAMUSCULAR at 11:03

## 2017-01-01 RX ADMIN — INSULIN ASPART 2 UNITS: 100 INJECTION, SOLUTION INTRAVENOUS; SUBCUTANEOUS at 21:21

## 2017-01-01 RX ADMIN — CEFTRIAXONE SODIUM 2 G: 2 INJECTION, POWDER, FOR SOLUTION INTRAMUSCULAR; INTRAVENOUS at 23:30

## 2017-01-01 RX ADMIN — FAMOTIDINE 20 MG: 20 TABLET, FILM COATED ORAL at 10:45

## 2017-01-01 RX ADMIN — CLOPIDOGREL 75 MG: 75 TABLET, FILM COATED ORAL at 11:28

## 2017-01-01 RX ADMIN — CEFTRIAXONE SODIUM 2 G: 2 INJECTION, SOLUTION INTRAVENOUS at 21:05

## 2017-01-01 RX ADMIN — ENOXAPARIN SODIUM 30 MG: 30 INJECTION SUBCUTANEOUS at 10:39

## 2017-01-01 RX ADMIN — ASPIRIN 81 MG: 81 TABLET, CHEWABLE ORAL at 11:28

## 2017-01-01 RX ADMIN — ISOSORBIDE MONONITRATE 60 MG: 60 TABLET, EXTENDED RELEASE ORAL at 10:55

## 2017-01-01 RX ADMIN — CARVEDILOL 12.5 MG: 12.5 TABLET, FILM COATED ORAL at 11:28

## 2017-01-01 RX ADMIN — VASOPRESSIN 0.02 UNITS/MIN: 20 INJECTION INTRAVENOUS at 20:53

## 2017-01-01 RX ADMIN — CEFTRIAXONE SODIUM 2 G: 2 INJECTION, POWDER, FOR SOLUTION INTRAMUSCULAR; INTRAVENOUS at 22:04

## 2017-01-01 RX ADMIN — Medication 0.02 MCG/KG/MIN: at 08:47

## 2017-01-01 RX ADMIN — FAMOTIDINE 20 MG: 20 TABLET, FILM COATED ORAL at 10:54

## 2017-01-01 RX ADMIN — CLOPIDOGREL 75 MG: 75 TABLET, FILM COATED ORAL at 10:55

## 2017-01-01 RX ADMIN — FENTANYL CITRATE 50 MCG: 50 INJECTION INTRAMUSCULAR; INTRAVENOUS at 23:03

## 2017-01-01 RX ADMIN — SODIUM CHLORIDE 1000 ML: 9 INJECTION, SOLUTION INTRAVENOUS at 15:26

## 2017-01-01 RX ADMIN — COLLAGENASE SANTYL 1 APPLICATION: 250 OINTMENT TOPICAL at 09:32

## 2017-03-11 PROBLEM — R00.1 BRADYCARDIA: Status: ACTIVE | Noted: 2017-01-01

## 2017-03-11 PROBLEM — F03.90 DEMENTIA (HCC): Status: ACTIVE | Noted: 2017-01-01

## 2017-03-11 PROBLEM — N39.0 URINARY TRACT INFECTION: Status: ACTIVE | Noted: 2017-01-01

## 2017-03-11 PROBLEM — N17.9 AKI (ACUTE KIDNEY INJURY) (HCC): Status: ACTIVE | Noted: 2017-01-01

## 2017-03-11 PROBLEM — S71.109A OPEN WOUND OF THIGH: Status: ACTIVE | Noted: 2017-01-01

## 2017-03-11 PROBLEM — D64.9 NORMOCYTIC ANEMIA: Status: ACTIVE | Noted: 2017-01-01

## 2017-03-11 PROBLEM — G93.41 METABOLIC ENCEPHALOPATHY: Status: ACTIVE | Noted: 2017-01-01

## 2017-03-11 NOTE — ED PROVIDER NOTES
EMERGENCY DEPARTMENT ENCOUNTER    CHIEF COMPLAINT  Chief Complaint: altered mental status  History given by: EMS, nursing home records  History limited by: pt unresponsive  Room Number: 661/1  PMD: Heath Velasco DO      HPI:  Pt is a 83 y.o. female who presents by EMS from [facility] with decreased responsiveness. EMS was called by [facility] because the patient was found unresponsive. Last time normal unknown, duration of unresponsiveness unknown, and baseline unknown.She was found to be hypotensive en route and was placed on nonrebreather O2. Records reviewed from nursing [facility] show that pt has a hx of HTN, diabetes, and dementia. Hx limited by pt's unresponsiveness.     Duration:  unknown  Onset: unknown  Timing: constant  Quality: unresponsiveness  Intensity/Severity: severe  Progression: unchanged  Associated Symptoms: unknown  Aggravating Factors: unknown  Alleviating Factors: unknown  Previous Episodes: unknown  Treatment before arrival: EMS transport, O2    PAST MEDICAL HISTORY  Active Ambulatory Problems     Diagnosis Date Noted   • Benign essential hypertension 12/13/2013   • Bradycardia 01/12/2017   • Cardiomyopathy 12/13/2013   • Stenosis of carotid artery 12/13/2013   • Diastolic dysfunction 12/13/2013   • Hyperlipidemia 12/13/2013   • Mitral valve insufficiency 12/13/2013   • Normocytic anemia 01/12/2017   • Renal artery stenosis 12/13/2013     Resolved Ambulatory Problems     Diagnosis Date Noted   • No Resolved Ambulatory Problems     Past Medical History   Diagnosis Date   • COPD (chronic obstructive pulmonary disease)    • Dementia 3/11/2017   • Diabetes mellitus    • Hypertension    • Open wound of thigh 1/12/2017   • TIA (transient ischemic attack)    • Type 2 diabetes mellitus 12/13/2013       PAST SURGICAL HISTORY  Past Surgical History   Procedure Laterality Date   • Back surgery         FAMILY HISTORY  Family History   Problem Relation Age of Onset   • Hypertension  Other        SOCIAL HISTORY  Social History     Social History   • Marital status:      Spouse name: N/A   • Number of children: N/A   • Years of education: N/A     Occupational History   • Not on file.     Social History Main Topics   • Smoking status: Former Smoker     Packs/day: 1.00     Years: 15.00     Types: Cigarettes   • Smokeless tobacco: Not on file   • Alcohol use No   • Drug use: No   • Sexual activity: No     Other Topics Concern   • Not on file     Social History Narrative   • No narrative on file       ALLERGIES  Review of patient's allergies indicates no known allergies.    REVIEW OF SYSTEMS  Review of Systems   Unable to perform ROS: Patient unresponsive       PHYSICAL EXAM  ED Triage Vitals   Temp Heart Rate Resp BP SpO2   -- 03/11/17 1309 03/11/17 1309 03/11/17 1309 03/11/17 1312    67 20 105/51 100 %      Temp src Heart Rate Source Patient Position BP Location FiO2 (%)   -- 03/11/17 1309 03/11/17 1309 03/11/17 1309 --    Monitor Lying Right arm        Physical Exam   Constitutional:   Elderly and frail, chronically ill appearing   HENT:   Head: Normocephalic and atraumatic.   Mouth/Throat: Oropharynx is clear and moist.   Eyes: EOM are normal. Pupils are equal, round, and reactive to light.   Neck: Normal range of motion. Neck supple.   Cardiovascular: Normal rate, regular rhythm, normal heart sounds and intact distal pulses.    Pulmonary/Chest: No respiratory distress.   O2 sats 100% on 3L O2   Abdominal: Soft. There is no tenderness.   Musculoskeletal: Normal range of motion. She exhibits no edema.   Neurological: She displays weakness (possible mild right leg).   Pt responds to painful stimuli. Opens and closes eyes, and moves all extremities in response to painful stimuli. Non-verbal   Skin: Skin is warm and dry. No rash noted.   Right medial thigh, wound that is deep through fascia, packed with gauze, Has slight white discharge with some granulation tissue.  no definitive purulent  discharge; appearance of  Chronic Healing infection. . Daughter believes it is improving slowly.         LAB RESULTS  Lab Results (last 24 hours)     Procedure Component Value Units Date/Time    Urinalysis With / Culture If Indicated [[id number]]  (Abnormal) Collected:  03/11/17 1359    Specimen:  Urine from Urine, Catheter Updated:  03/11/17 1420     Color, UA Dark Yellow (A)      Appearance, UA Cloudy (A)      pH, UA 5.5      Specific Gravity, UA 1.020      Glucose, UA Negative      Ketones, UA Trace (A)      Bilirubin, UA Negative      Blood, UA Small (1+) (A)      Protein, UA Trace (A)      Leuk Esterase, UA Large (3+) (A)      Nitrite, UA Negative      Urobilinogen, UA 1.0 E.U./dL     Urinalysis, Microscopic Only [[id number]]  (Abnormal) Collected:  03/11/17 1359    Specimen:  Urine from Urine, Catheter Updated:  03/11/17 1440     RBC, UA 0-2 /HPF      WBC, UA Too Numerous to Count (A) /HPF      Bacteria, UA 3+ (A) /HPF      Squamous Epithelial Cells, UA 7-12 (A) /HPF      Hyaline Casts, UA 13-20 /LPF      Methodology Manual Light Microscopy     Urine Culture [[id number]] Collected:  03/11/17 1359    Specimen:  Urine from Urine, Catheter Updated:  03/11/17 1409    CBC & Differential [[id number]] Collected:  03/11/17 1539    Specimen:  Blood Updated:  03/11/17 1608    Narrative:       The following orders were created for panel order CBC & Differential.  Procedure                               Abnormality         Status                     ---------                               -----------         ------                     Scan Slide[[id number]]                                        Final result               CBC Auto Differential[[id number]]         Abnormal            Final result                 Please view results for these tests on the individual orders.    Comprehensive Metabolic Panel [[id number]]  (Abnormal) Collected:  03/11/17 1539    Specimen:  Blood Updated:  03/11/17 1622     Glucose 285 (H) mg/dL      BUN 43  (H) mg/dL      Creatinine 2.14 (H) mg/dL      Sodium 136 mmol/L      Potassium 5.3 (H) mmol/L      Chloride 96 (L) mmol/L      CO2 19.2 (L) mmol/L      Calcium 9.3 mg/dL      Total Protein 6.3 g/dL      Albumin 3.30 (L) g/dL      ALT (SGPT) 27 U/L      AST (SGOT) 29 U/L      Alkaline Phosphatase 89 U/L      Total Bilirubin 0.7 mg/dL      eGFR  African Amer 27 (L) mL/min/1.73      Globulin 3.0 gm/dL      A/G Ratio 1.1 g/dL      BUN/Creatinine Ratio 20.1      Anion Gap 20.8 mmol/L     Narrative:       The MDRD GFR formula is only valid for adults with stable renal function between ages 18 and 70.    CK [[id number]]  (Abnormal) Collected:  03/11/17 1539    Specimen:  Blood Updated:  03/11/17 1622     Creatine Kinase 239 (H) U/L     Troponin [[id number]]  (Abnormal) Collected:  03/11/17 1539    Specimen:  Blood Updated:  03/11/17 1625     Troponin T 0.049 (H) ng/mL     Narrative:       Troponin T Reference Ranges:  Less than 0.03 ng/mL:    Negative for AMI  0.03 to 0.09 ng/mL:      Indeterminant for AMI  Greater than 0.09 ng/mL: Positive for AMI    CBC Auto Differential [[id number]]  (Abnormal) Collected:  03/11/17 1539    Specimen:  Blood Updated:  03/11/17 1608     WBC 12.76 (H) 10*3/mm3      RBC 3.61 (L) 10*6/mm3      Hemoglobin 10.6 (L) g/dL      Hematocrit 32.1 (L) %      MCV 88.9 fL      MCH 29.4 pg      MCHC 33.0 g/dL      RDW 15.6 (H) %      RDW-SD 51.0 fl      MPV 9.6 fL      Platelets 357 10*3/mm3      Neutrophil % 81.7 (H) %      Lymphocyte % 10.1 (L) %      Monocyte % 5.0 %      Eosinophil % 0.5 %      Basophil % 0.2 %      Immature Grans % 2.5 (H) %      Neutrophils, Absolute 10.42 (H) 10*3/mm3      Lymphocytes, Absolute 1.29 10*3/mm3      Monocytes, Absolute 0.64 10*3/mm3      Eosinophils, Absolute 0.06 10*3/mm3      Basophils, Absolute 0.03 10*3/mm3      Immature Grans, Absolute 0.32 (H) 10*3/mm3      nRBC 0.0 /100 WBC     Scan Slide [[id number]] Collected:  03/11/17 1539    Specimen:  Blood Updated:  03/11/17  1608     Anisocytosis Mod/2+      Crenated RBC's Slight/1+      Schistocytes Slight/1+      Spherocytes Slight/1+      WBC Morphology Normal      Platelet Morphology Normal     Protime-INR [[id number]]  (Abnormal) Collected:  03/11/17 1647    Specimen:  Blood Updated:  03/11/17 1719     Protime 15.1 (H) Seconds      INR 1.24 (H)     Lactic Acid, Plasma [[id number]]  (Abnormal) Collected:  03/11/17 1647    Specimen:  Blood Updated:  03/11/17 1723     Lactate 2.6 (C) mmol/L           I ordered the above labs and reviewed the results    RADIOLOGY  XR Chest 1 View   Final Result   Pulmonary arterial enlargement. Atherosclerotic disease. No   evidence for acute disease.       This report was finalized on 3/11/2017 3:56 PM by Dr. Agustin Wang MD.          CT Head Without Contrast   Final Result   Chronic small vessel ischemic white matter change. Chronic   right cerebral hemisphere infarction. No evidence for hemorrhage or   acute intracranial abnormality.       This report was finalized on 3/11/2017 3:48 PM by Dr. Agustin Wang MD.             I ordered the above noted radiological studies. Interpreted by radiologist. Reviewed by me in PACS.       PROCEDURES  Procedures    EKG         EKG time: 2:19 PM  Rhythm/Rate: Sinus bradycardia, rate 52  P waves and RI: normal  QRS, axis: narrow complex   ST and T waves: non-specific changes   Interpreted Contemporaneously by me, independently viewed  No prior EKG available for comparison      PROGRESS AND CONSULTS  ED Course     1:15 PM: Pt initially arrived on nonrebreather O2, taken off and reduced to 2L nasal cannula. Ordered labs, CXR, CT head, EKG for further analysis.    1:23 PM: Nursing staff obtained report from nursing home, pt normally not ambulatory, alert and oriented x1. Unresponsiveness today began around 12:20 PM.     3:24 PM: Pt rechecked, HR in the 60s, /57. She has some increased awareness, able to look at examiner directly.    4:05 PM: Pt rechecked,  family now at bedside. They state that she had a wound on one of her legs which was surgically treated, and she subsequently went into rehab. Since then, her mental status has continued to decline gradually. She has had UTI since then, and an infection to the wound at one point as well. The wound to the right medial thigh was visualized, family at bedside states that it actually looks improved compared to previous. Informed them of findings which show a UTI, need for admission for further evaluation. Family understands and agrees.    6:08 PM: Call w/ Dr. Alvarez who agrees to admit the pt.    7:02 PM: Pt and family rechecked, HR in the 60s, O2 sats 100%, /62. She is now talking and oriented to person. Doing much better. No distress. She reports that she is not in any pain at current. Informed family of admission for UTI. Rocephin and IVF have been given in the ED. Pt and family understand and agree with the plan. All questions answered.      MEDICAL DECISION MAKING  Results were reviewed/discussed with the patient and they were also made aware of online access. Pt also made aware that some labs, such as cultures, will not be resulted during ER visit and follow up with PMD is necessary.     MDM  Number of Diagnoses or Management Options  Acute renal failure, unspecified acute renal failure type:   Altered mental status, unspecified altered mental status type:   Sepsis, due to unspecified organism:   Urinary tract infection, site unspecified:      Amount and/or Complexity of Data Reviewed  Clinical lab tests: ordered and reviewed (Lactate 2.6, PT 15.1, INR 1.24, Glucose 285, BUN 43, Creatinine 2.14, potassium 5.3, Co2 19.2, , Troponin 0.049, WBC 12.76, Hgb 10.6, 3+ UA bacteria w/ too numerous WBC)  Tests in the radiology section of CPT®: ordered and reviewed (CT head shows chronic changes, no acute disease, CXR shows no active disease)  Tests in the medicine section of CPT®: ordered and reviewed  Decide to  obtain previous medical records or to obtain history from someone other than the patient: yes  Obtain history from someone other than the patient: yes (EMS, family late in visit)  Review and summarize past medical records: yes (Note reviewed from 3/3 in nursing home records, appeared pt was having some problems with increased confusion and decreased responsiveness at that time. She was taken off Levaquin for a UTI on 3/5)  Discuss the patient with other providers: yes (Dr. Alvarez - admitting)  Independent visualization of images, tracings, or specimens: yes    Patient Progress  Patient progress: stable       DIAGNOSIS  Final diagnoses:   Urinary tract infection, site unspecified   Altered mental status, unspecified altered mental status type   Sepsis, due to unspecified organism   Acute renal failure, unspecified acute renal failure type   Dementia without behavioral disturbance, unspecified dementia type       DISPOSITION  ADMISSION: Patient admitted by Dr. Alvarez to telemetry.    Discussed treatment plan and reason for admission with pt/family and admitting physician.  Pt/family voiced understanding of the plan for admission for further testing/treatment as needed.       Latest Documented Vital Signs:  As of 9:59 PM  BP- 134/50 HR- 54 Temp- 97.6 °F (36.4 °C) O2 sat- 100%    --  Documentation assistance provided by jg Mccoy MD for Dr. Mccoy.  Information recorded by the jg was done at my direction and has been verified and validated by me.     Marek Slaughter  03/11/17 1927       Gus Mccoy MD  03/11/17 5252

## 2017-03-11 NOTE — SIGNIFICANT NOTE
03/11/17 1430   Rehab Treatment   Discipline speech language pathologist   Rehab Evaluation   Evaluation Not Performed unable to evaluate, medical status change  (Pt responding to painful stimuli only. RN indicated bedside swallow should be held at this time. SLP in agreement. Will evaluate as indicated. )   Recommendations   SLP - Next Appointment (As indicated)

## 2017-03-11 NOTE — PROGRESS NOTES
Clinical Pharmacy Services: Medication History    Carley Madden is a 83 y.o. female presenting to [facility] with chief complaint of:   AMS    Medication information was obtained from: Nursing home medication lis   Pharmacy and Phone Number: [facility] medication list     Prior to Admission Medications       Prescriptions Last Dose Informant Patient Reported? Taking?      aspirin 81 MG chewable tablet  Nursing Home Yes Yes    Chew 81 mg Daily.    atorvastatin (LIPITOR) 40 MG tablet  Nursing Home Yes Yes    Take 40 mg by mouth Daily.    carvedilol (COREG) 12.5 MG tablet  Nursing Home Yes Yes    Take 12.5 mg by mouth 2 (Two) Times a Day With Meals.    castor oil-balsam peru (VENELEX) ointment  Nursing Home Yes Yes    Apply 1 application topically 2 (Two) Times a Day. To heel blisters    clopidogrel (PLAVIX) 75 MG tablet  Nursing Home Yes Yes    Take 75 mg by mouth Daily.    collagenase 250 UNIT/GM ointment  Nursing Home Yes Yes    Apply 1 application topically Daily. ro wound on right thigh    dakin's (HYSEPT) 0.25 % solution topical solution  Nursing Home Yes Yes    Apply 1 application topically Daily. To pack right thigh wound daily and cover with optifoam    furosemide (LASIX) 20 MG tablet  Nursing Home Yes Yes    Take 20 mg by mouth Daily.    glipiZIDE (GLUCOTROL) 5 MG tablet  Nursing Home Yes Yes    Take 2.5 mg by mouth Daily.    HYDROcodone-acetaminophen (NORCO) 5-325 MG per tablet  Nursing Home Yes Yes    Take 1 tablet by mouth Every 6 (Six) Hours As Needed for moderate pain (4-6).    isosorbide mononitrate (IMDUR) 60 MG 24 hr tablet  Nursing Home Yes Yes    Take 60 mg by mouth Daily.    levothyroxine (SYNTHROID, LEVOTHROID) 88 MCG tablet  Nursing Home Yes Yes    Take 88 mcg by mouth Every Morning.    lisinopril (PRINIVIL,ZESTRIL) 40 MG tablet  Nursing Home Yes Yes    Take 40 mg by mouth Daily.    naproxen (NAPROSYN) 500 MG tablet  Nursing Home Yes Yes    Take 500 mg by mouth 2  (Two) Times a Day As Needed for Mild Pain (1-3).    nitroglycerin (NITROSTAT) 0.4 MG SL tablet  Nursing Home Yes Yes    Place 0.4 mg under the tongue Every 5 (Five) Minutes As Needed for chest pain. Take no more than 3 doses in 15 minutes.    raNITIdine (ZANTAC) 150 MG tablet  Nursing Home Yes Yes    Take 150 mg by mouth 2 (Two) Times a Day.     Recently completed treatment for UTI with levofloxacin.     This medication list is complete to the best of my knowledge as of 3/11/2017    Please call if questions.    Miranda Crowley, PharmD, BCPS  3/11/2017 1:22 PM

## 2017-03-12 NOTE — PLAN OF CARE
Problem: Patient Care Overview (Adult)  Goal: Plan of Care Review  Outcome: Ongoing (interventions implemented as appropriate)    03/12/17 0442   Coping/Psychosocial Response Interventions   Plan Of Care Reviewed With patient;daughter   Patient Care Overview   Progress no change       Goal: Adult Individualization and Mutuality  Outcome: Ongoing (interventions implemented as appropriate)  Goal: Discharge Needs Assessment  Outcome: Ongoing (interventions implemented as appropriate)    Problem: Skin Integrity Impairment, Risk/Actual (Adult)  Goal: Identify Related Risk Factors and Signs and Symptoms  Outcome: Ongoing (interventions implemented as appropriate)  Goal: Skin Integrity/Wound Healing  Outcome: Ongoing (interventions implemented as appropriate)    Problem: Fall Risk (Adult)  Goal: Identify Related Risk Factors and Signs and Symptoms  Outcome: Ongoing (interventions implemented as appropriate)  Goal: Absence of Falls  Outcome: Ongoing (interventions implemented as appropriate)

## 2017-03-12 NOTE — PLAN OF CARE
Problem: Inpatient Physical Therapy  Goal: Gait Training Goal LTG- PT    03/12/17 1510   Gait Training PT LTG   Gait Training Goal PT LTG, Date Established 03/12/17   Gait Training Goal PT LTG, Time to Achieve 1 wk   Gait Training Goal PT LTG, [place] Level minimum assist (75% patient effort)   Gait Training Goal PT LTG, Assist Device walker, rolling   Gait Training Goal PT LTG, Distance to Achieve 25

## 2017-03-12 NOTE — PROGRESS NOTES
Acute Care - Physical Therapy Initial Evaluation  [facility]     Patient Name: Carley Madden  : 1933  MRN: [id number]  Today's Date: 3/12/2017   Onset of Illness/Injury or Date of Surgery Date: 17  Date of Referral to PT: 17  Referring Physician: LEIA YEH      Admit Date: 3/11/2017     Visit Dx:    ICD-10-CM ICD-9-CM   1. Urinary tract infection, site unspecified N39.0    2. Altered mental status, unspecified altered mental status type R41.82 780.97   3. Sepsis, due to unspecified organism A41.9 038.9     995.91   4. Acute renal failure, unspecified acute renal failure type N17.9 584.9   5. Dementia without behavioral disturbance, unspecified dementia type F03.90 294.20   6. Decreased ambulation status R68.89 780.99     Patient Active Problem List   Diagnosis   • Urinary tract infection   • SUSIE (acute kidney injury)   • Benign essential hypertension   • Bradycardia   • Cardiomyopathy   • Stenosis of carotid artery   • Diastolic dysfunction   • Type 2 diabetes mellitus   • Hyperlipidemia   • Mitral valve insufficiency   • Normocytic anemia   • Open wound of thigh   • Renal artery stenosis   • Metabolic encephalopathy   • Dementia     Past Medical History   Diagnosis Date   • Bradycardia 2017   • Cardiomyopathy 2013   • COPD (chronic obstructive pulmonary disease)    • Dementia 3/11/2017   • Diabetes mellitus    • Diastolic dysfunction 2013   • Hyperlipidemia 2013   • Hypertension    • Mitral valve insufficiency 2013   • Normocytic anemia 2017   • Open wound of thigh 2017   • Renal artery stenosis 2013   • Stenosis of carotid artery 2013   • Stroke    • TIA (transient ischemic attack)    • Type 2 diabetes mellitus 2013     Past Surgical History   Procedure Laterality Date   • Back surgery            PT ASSESSMENT (last 72 hours)      PT Evaluation       17 1459 17 2130    Rehab Evaluation    Document Type evaluation   -JR     Subjective Information agree to therapy;decreased LOC   FAMILY PRESENT. Pt JUST HAD PAIN MEDS AND IS NOT VERBALIZING  -JR     Patient Effort, Rehab Treatment poor  -JR     Symptoms Noted During/After Treatment none  -JR     General Information    Patient Profile Review yes  -JR     Onset of Illness/Injury or Date of Surgery Date 03/12/17  -JR     Referring Physician PER DR. YEH  -JR     General Observations SUPINE IN BED, DAUGHTER AND SON PRESENT.    -JR     Pertinent History Of Current Problem FACILITY RESIDENT, DEMENTIA, UTI, LARGE R THIGH WOUND.   -JR     Precautions/Limitations fall precautions;other (see comments)   MENTAL STATUS.    -JR     Prior Level of Function min assist:;transfer;mod assist:;gait;ADL's  -JR     Equipment Currently Used at Home wheelchair;walker, rolling  -JR     Plans/Goals Discussed With patient and family;agreed upon  -JR     Risks Reviewed patient and family:;LOB;nausea/vomiting;dizziness;increased discomfort;change in vital signs;increased drainage;lines disloged  -JR     Benefits Reviewed patient and family:;improve function;increase independence;increase strength;increase balance;decrease pain;decrease risk of DVT;improve skin integrity;increase knowledge  -JR     Barriers to Rehab cognitive status  -JR     Living Environment    Lives With facility resident  -JR facility resident  -SS    Living Arrangements extended care facility  -JR extended care facility  -SS    Home Accessibility  no concerns  -SS    Stair Railings at Home  none  -SS    Type of Financial/Environmental Concern  none  -SS    Transportation Available  ambulance  -SS    Clinical Impression    Date of Referral to PT 03/12/17  -JR     PT Diagnosis DECREASED AMBULATION AND MOBILITY   -JR     Prognosis FAIR  -JR     Functional Level At Time Of Evaluation MOD-MAX ASSIST FOR TRANSFERS AND GAIT.    -JR     Patient/Family Goals Statement TO AMBULATE  -JR     Criteria for Skilled Therapeutic Interventions Met  yes;treatment indicated  -JR     Pathology/Pathophysiology Noted (Describe Specifically for Each System) musculoskeletal;pulmonary  -JR     Impairments Found (describe specific impairments) aerobic capacity/endurance;gait, locomotion, and balance  -JR     Rehab Potential fair, will monitor progress closely  -JR     Pain Assessment    Pain Assessment Kauffman-Albert FACES  -JR     Kauffman-Albert FACES Pain Rating 6  -JR     Pain Type Surgical pain  -JR     Pain Location Leg  -JR     Pain Orientation Right;Proximal  -JR     Pain Intervention(s) Medication (See MAR);Repositioned  -JR     Response to Interventions TOLERATED   -JR     Cognitive Assessment/Intervention    Current Cognitive/Communication Assessment impaired  -JR     Orientation Status oriented to;situation  -JR     Follows Commands/Answers Questions able to follow single-step instructions;needs cueing;needs increased time;needs repetition;speech unintelligible  -JR     Personal Safety moderate impairment  -JR     ROM (Range of Motion)    General ROM lower extremity range of motion deficits identified   R LE AROM LIMITED DUE TO PAIN IN R THIGH BY 50%   -JR     MMT (Manual Muscle Testing)    General MMT Assessment upper extremity strength deficits identified;lower extremity strength deficits identified   3/5 STRENGTH NOTED THROUGHOUT ALL 4 EXTREMITIES.    -     Bed Mobility, Assessment/Treatment    Bed Mobility, Assistive Device draw sheet;head of bed elevated  -     Bed Mob, Supine to Sit, [place] nonverbal cues required (demo/gesture);moderate assist (50% patient effort);minimum assist (75% patient effort)  -     Bed Mob, Sit to Supine, [place] nonverbal cues required (demo/gesture);moderate assist (50% patient effort);maximum assist (25% patient effort)  -     Transfer Assessment/Treatment    Transfers, Sit-Stand [place] maximum assist (25% patient effort);2 person assist required;nonverbal cues required (demo/gesture);verbal cues required   -JR     Transfers, Stand-Sit [place] nonverbal cues required (demo/gesture);verbal cues required;maximum assist (25% patient effort);2 person assist required  -JR     Transfers, Sit-Stand-Sit, Assist Device rolling walker   WAS ABLE TO GET TO 75% OF UPRIGHT POSITION X 2 FOR A FEW SEC  -JR     Gait Assessment/Treatment    Gait, [place] Level unable to perform;not tested  -JR     Therapy Exercises    Bilateral Lower Extremities 10 reps;AAROM:;LAQ;hip flexion   REQUIRED ASSISTANCE WITH R LE.    -JR     Bilateral Upper Extremity AAROM:;10 reps;elbow flexion/extension;shoulder abduction/adduction  -JR     Positioning and Restraints    Pre-Treatment Position in bed  -JR     Post Treatment Position bed  -JR     In Bed notified nsg;supine;call light within reach;encouraged to call for assist;exit alarm on;with family/caregiver  -       03/11/17 1430       Rehab Evaluation    Evaluation Not Performed unable to evaluate, medical status change   Pt responding to painful stimuli only. RN indicated bedside swallow should be held at this time. Will evaluate as indicated.   -       User Key  (r) = Recorded By, (t) = Taken By, (c) = Cosigned By    Initials Name Provider Type     Alisha Grove, JEAN MARIE Registered Nurse    SA Arely Roth Speech and Language Pathologist    JR Mau Marie, PT Physical Therapist          Physical Therapy Education     Title: PT OT SLP Therapies (Active)     Topic: Physical Therapy (Active)     Point: Mobility training (Active)    Learning Progress Summary    Learner Readiness Method Response Comment Documented by Status   Patient Nonacceptance E,D NR   03/12/17 1506 Active               Point: Home exercise program (Active)    Learning Progress Summary    Learner Readiness Method Response Comment Documented by Status   Patient Nonacceptance E,D NR   03/12/17 1509 Active               Point: Body mechanics (Active)    Learning Progress Summary    Learner Readiness  Method Response Comment Documented by Status   Patient Nonacceptance MAHOGANY KIDD   03/12/17 1509 Active               Point: Precautions (Active)    Learning Progress Summary    Learner Readiness Method Response Comment Documented by Status   Patient Nonacceptance MAHOGANY KIDD   03/12/17 1509 Active                      User Key     Initials Effective Dates Name Provider Type Discipline     01/07/16 -  Mau Marie, PT Physical Therapist PT                PT Recommendation and Plan  Anticipated Discharge Disposition: extended care facility  Planned Therapy Interventions: bed mobility training, gait training, balance training, stretching, strengthening  PT Frequency: daily, per priority policy             IP PT Goals       03/12/17 1510          Bed Mobility PT LTG    Bed Mobility PT LTG, Date Established 03/12/17  -      Bed Mobility PT LTG, Time to Achieve 1 wk  -JR      Bed Mobility PT LTG, Activity Type all bed mobility  -JR      Bed Mobility PT LTG, [place] Level minimum assist (75% patient effort)  -JR      Transfer Training PT LTG    Transfer Training PT LTG, Date Established 03/12/17  -JR      Transfer Training PT LTG, Time to Achieve 1 wk  -JR      Transfer Training PT LTG, Activity Type all transfers  -JR      Transfer Training PT LTG, [place] Level minimum assist (75% patient effort)  -JR      Transfer Training PT LTG, Assist Device walker, rolling  -JR      Gait Training PT LTG    Gait Training Goal PT LTG, Date Established 03/12/17  -JR      Gait Training Goal PT LTG, Time to Achieve 1 wk  -JR      Gait Training Goal PT LTG, [place] Level minimum assist (75% patient effort)  -JR      Gait Training Goal PT LTG, Assist Device walker, rolling  -JR      Gait Training Goal PT LTG, Distance to Achieve 25  -JR        User Key  (r) = Recorded By, (t) = Taken By, (c) = Cosigned By    Initials Name Provider Type    JR Mau Marie, PT Physical Therapist                Outcome Measures        03/12/17 1511          How much help from another person do you currently need...    Turning from your back to your side while in flat bed without using bedrails? 2  -JR      Moving from lying on back to sitting on the side of a flat bed without bedrails? 2  -JR      Moving to and from a bed to a chair (including a wheelchair)? 1  -JR      Standing up from a chair using your arms (e.g., wheelchair, bedside chair)? 2  -JR      Climbing 3-5 steps with a railing? 1  -JR      To walk in hospital room? 1  -JR      AM-PAC 6 Clicks Score 9  -JR      Functional Assessment    Outcome Measure Options AM-PAC 6 Clicks Basic Mobility (PT)  -JR        User Key  (r) = Recorded By, (t) = Taken By, (c) = Cosigned By    Initials Name Provider Type    JR Mau Marie PT Physical Therapist           Time Calculation:         PT Charges       03/12/17 1511          Time Calculation    Start Time 1446  -JR      Stop Time 1511  -JR      Time Calculation (min) 25 min  -JR      PT Received On 03/12/17  -      PT - Next Appointment 03/13/17  -      PT Goal Re-Cert Due Date 03/19/17  -        User Key  (r) = Recorded By, (t) = Taken By, (c) = Cosigned By    Initials Name Provider Type    JR Mau Marie, PT Physical Therapist          Therapy Charges for Today     Code Description Service Date Service Provider Modifiers Qty    54120151361 HC PT EVAL MOD COMPLEXITY 2 3/12/2017 Mau Marie, PT GP 1    41885064812 HC PT THER SUPP EA 15 MIN 3/12/2017 Mau Marie, PT GP 2          PT G-Codes  Outcome Measure Options: (S) AM-PAC 6 Clicks Basic Mobility (PT)      Mau Marie, PT  3/12/2017

## 2017-03-12 NOTE — PLAN OF CARE
Problem: Inpatient Physical Therapy  Goal: Transfer Training Goal 1 LTG- PT    03/12/17 1510   Transfer Training PT LTG   Transfer Training PT LTG, Date Established 03/12/17   Transfer Training PT LTG, Time to Achieve 1 wk   Transfer Training PT LTG, Activity Type all transfers   Transfer Training PT LTG, [place] Level minimum assist (75% patient effort)   Transfer Training PT LTG, Assist Device walker, rolling

## 2017-03-12 NOTE — CONSULTS
CONSULT NOTE    Infectious Diseases - Diane Mayfield MD  [facility]       Patient Identification:  Name: Carley Madden  Age: 83 y.o.  Sex: female  :  1933  MRN: [id number]             Date of Consultation:  3/11/2017        Primary Care Physician: Heath Velasco DO                               Requesting Physician: Dr. 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  Given the chronicity of the right thigh wound at an attempt to make diagnosis of underlying pathophysiology is important to prevent future complications.  We'll discuss with Dr. Alvarez in the morning.       History of Present Illness:    patient is a 83-year-old female who has complicated past medical history including multiple hospitalizations and outpatient surgical visit for her right thigh wound and cellulitis.  Patient was hospitalized in 2017 at [facility] and was discharged after 3 days of care.  She has done well since then until 2-3 days prior to coming to the hospital she started to decline with decreased responsiveness and decreased function.  Patient is being admitted for further care infectious disease service was consulted to comment upon ongoing right thigh wound  along with contribution of underlying urinary tract infection  to her symptoms.     Past Medical History:  Past Medical History   Diagnosis Date   • Bradycardia 1/12/2017   • Cardiomyopathy 12/13/2013   • COPD (chronic obstructive pulmonary disease)    • Dementia 3/11/2017   • Diabetes mellitus    • Diastolic dysfunction 12/13/2013   • Hyperlipidemia 12/13/2013   • Hypertension    • Mitral valve insufficiency 12/13/2013   • Normocytic anemia 1/12/2017   • Open wound of thigh 1/12/2017   • Renal artery stenosis 12/13/2013   • Stenosis of carotid artery 12/13/2013   • Stroke    • TIA (transient ischemic attack)    • Type 2 diabetes mellitus 12/13/2013     Past Surgical History:  Past Surgical History   Procedure Laterality Date   • Back surgery        Home Meds:  Prescriptions Prior to Admission   Medication Sig Dispense Refill Last Dose   • aspirin 81 MG chewable tablet Chew 81 mg Daily.      • atorvastatin (LIPITOR) 40 MG tablet Take 40 mg by mouth Daily.      • carvedilol (COREG) 12.5 MG tablet Take 12.5 mg by mouth 2 (Two) Times a Day With Meals.      • castor oil-balsam peru (VENELEX) ointment Apply 1 application topically 2 (Two) Times a Day. To heel blisters      • clopidogrel (PLAVIX) 75 MG tablet Take 75 mg by mouth Daily.      • collagenase 250 UNIT/GM ointment Apply 1 application topically Daily. ro wound on right thigh      • dakin's (HYSEPT) 0.25 % solution topical solution Apply 1 application topically Daily. To pack right thigh wound daily and cover with optifoam      • furosemide (LASIX) 20 MG tablet Take 20 mg by mouth Daily.      • glipiZIDE (GLUCOTROL) 5 MG tablet Take 2.5 mg by mouth Daily.      • HYDROcodone-acetaminophen (NORCO) 5-325 MG per tablet Take 1 tablet by mouth Every 6 (Six) Hours As Needed for moderate pain (4-6).      • isosorbide mononitrate (IMDUR) 60 MG 24 hr tablet Take 60 mg by mouth Daily.      • levothyroxine (SYNTHROID, LEVOTHROID) 88 MCG tablet Take 88 mcg by mouth Every  Morning.      • lisinopril (PRINIVIL,ZESTRIL) 40 MG tablet Take 40 mg by mouth Daily.      • naproxen (NAPROSYN) 500 MG tablet Take 500 mg by mouth 2 (Two) Times a Day As Needed for Mild Pain (1-3).      • nitroglycerin (NITROSTAT) 0.4 MG SL tablet Place 0.4 mg under the tongue Every 5 (Five) Minutes As Needed for chest pain. Take no more than 3 doses in 15 minutes.      • raNITIdine (ZANTAC) 150 MG tablet Take 150 mg by mouth 2 (Two) Times a Day.        Current Meds:     Current Facility-Administered Medications:   •  acetaminophen (TYLENOL) tablet 650 mg, 650 mg, Oral, Q4H PRN, Hari Alvarez MD  •  [START ON 3/12/2017] aspirin chewable tablet 81 mg, 81 mg, Oral, Daily, Hari Alvarez MD  •  [START ON 3/12/2017] atorvastatin (LIPITOR) tablet 40 mg, 40 mg, Oral, Daily, Hari Alvarez MD  •  carvedilol (COREG) tablet 12.5 mg, 12.5 mg, Oral, BID With Meals, Hari Alvarez MD, 12.5 mg at 03/11/17 2335  •  [START ON 3/12/2017] castor oil-balsam peru (VENELEX) ointment 1 application, 1 application, Topical, Q12H, Hari Alvarez MD  •  [START ON 3/12/2017] cefTRIAXone (ROCEPHIN) IVPB 2 g, 2 g, Intravenous, Q24H, Hari Alvarez MD  •  [START ON 3/12/2017] clopidogrel (PLAVIX) tablet 75 mg, 75 mg, Oral, Daily, Hari Alvarez MD  •  [START ON 3/12/2017] collagenase ointment 1 application, 1 application, Topical, Daily, Hari Alvarez MD  •  [START ON 3/12/2017] dakin's (HYSEPT) 0.25 % topical solution 1 mL, 1 application, Topical, Q24H, Hari Alvarez MD  •  dextrose (D50W) solution 25 g, 25 g, Intravenous, Q15 Min PRN, Hari Alvarez MD  •  dextrose (GLUTOSE) oral gel 15 g, 15 g, Oral, Q15 Min PRN, Hari Alvarez MD  •  [START ON 3/12/2017] enoxaparin (LOVENOX) syringe 30 mg, 30 mg, Subcutaneous, Daily, Hari Alvarez MD  •  [START ON 3/12/2017] famotidine (PEPCID) tablet 20 mg, 20 mg, Oral, BID, Hari Alvarez MD  •  glucagon (human recombinant) (GLUCAGEN  DIAGNOSTIC) injection 1 mg, 1 mg, Subcutaneous, Q15 Min PRN, Hari Alvarez MD  •  HYDROcodone-acetaminophen (NORCO) 5-325 MG per tablet 1 tablet, 1 tablet, Oral, Q6H PRN, Hari Alvarez MD  •  insulin aspart (novoLOG) injection 0-7 Units, 0-7 Units, Subcutaneous, 4x Daily AC & at Bedtime, Hari Alvarez MD, 0 Units at 03/11/17 2336  •  [START ON 3/12/2017] isosorbide mononitrate (IMDUR) 24 hr tablet 60 mg, 60 mg, Oral, Daily, Hari Alvarez MD  •  [START ON 3/12/2017] levothyroxine (SYNTHROID, LEVOTHROID) tablet 88 mcg, 88 mcg, Oral, Q AM, Hari Avlarez MD  •  nitroglycerin (NITROSTAT) SL tablet 0.4 mg, 0.4 mg, Sublingual, Q5 Min PRN, Hari Alvarez MD  •  ondansetron (ZOFRAN) tablet 4 mg, 4 mg, Oral, Q6H PRN **OR** ondansetron ODT (ZOFRAN-ODT) disintegrating tablet 4 mg, 4 mg, Oral, Q6H PRN **OR** ondansetron (ZOFRAN) injection 4 mg, 4 mg, Intravenous, Q6H PRN, Hari Alvarez MD  •  sodium chloride 0.9 % bolus 1,000 mL, 1,000 mL, Intravenous, Once, Gus Mccoy MD  •  sodium chloride 0.9 % flush 1-10 mL, 1-10 mL, Intravenous, PRN, Hari Alvarez MD  •  sodium chloride 0.9 % infusion, 125 mL/hr, Intravenous, Continuous, Gus Mccoy MD, Last Rate: 125 mL/hr at 03/11/17 1726, 125 mL/hr at 03/11/17 1726  Allergies:  No Known Allergies  Social History:   Social History   Substance Use Topics   • Smoking status: Former Smoker     Packs/day: 1.00     Years: 15.00     Types: Cigarettes   • Smokeless tobacco: Not on file   • Alcohol use No      Family History:  Family History   Problem Relation Age of Onset   • Hypertension Other           Review of Systems  See history of present illness and past medical history.  Limited because of mental status changes       Vitals:   Visit Vitals   • /50 (BP Location: Left arm, Patient Position: Lying)   • Pulse 54   • Temp 97.6 °F (36.4 °C)   • Resp 18   • SpO2 100%     I/O: No intake or output data in the 24 hours ending 03/11/17  2351  Exam:  General Appearance:    drowsy and disoriented and does not appear to be toxic or septic    Head:    Normocephalic, without obvious abnormality, atraumatic   Eyes:    PERRL, conjunctiva/corneas clear, EOM's intact, both eyes   Ears:    Normal external ear canals, both ears   Nose:   Nares normal, septum midline, mucosa normal, no drainage    or sinus tenderness   Throat:   Lips, tongue, gums normal; oral mucosa pink and moist   Neck:   Supple, symmetrical, trachea midline, no adenopathy;     thyroid:  no enlargement/tenderness/nodules; no carotid    bruit or JVD   Back:     Symmetric, no curvature, ROM normal, no CVA tenderness   Lungs:     Clear to auscultation bilaterally, respirations unlabored   Chest Wall:    No tenderness or deformity    Heart:    Regular rate and rhythm, S1 and S2 normal, no murmur, rub   or gallop   Abdomen:     Soft, non-tender, bowel sounds active all four quadrants,     no masses, no hepatomegaly, no splenomegaly   Extremities:   Non healing non-cellulitic wound in the right medial thigh with tenderness out of proportion to the appearance of the wound.  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     Pulses:   Pulses palpable in all extremities; symmetric all extremities   Skin:   Skin color normal, Skin is warm and dry,  no rashes or palpable lesions       Data Review:    I reviewed the patient's new clinical results.    Results from last 7 days  Lab Units 03/11/17  1539   WBC 10*3/mm3 12.76*   HEMOGLOBIN g/dL 10.6*   PLATELETS 10*3/mm3 357       Results from last 7 days  Lab Units 03/11/17  1539   SODIUM mmol/L 136   POTASSIUM mmol/L 5.3*   CHLORIDE mmol/L 96*   TOTAL CO2 mmol/L 19.2*   BUN mg/dL 43*   CREATININE mg/dL 2.14*   CALCIUM mg/dL 9.3   GLUCOSE mg/dL 285*       Assessment:  Principal Problem:    SUSIE (acute kidney injury)  Active Problems:    Urinary tract infection    Type 2 diabetes  mellitus    Open wound of thigh    Metabolic encephalopathy    Dementia      Plan:  Continue present antibiotics, follow up on culture results and see above.  Diane Valdes MD   3/11/2017  11:51 PM    Much of this encounter note is an electronic transcription/translation of spoken language to printed text. The electronic translation of spoken language may permit erroneous, or at times, nonsensical words or phrases to be inadvertently transcribed; Although I have reviewed the note for such errors, some may still exist

## 2017-03-12 NOTE — H&P
Patient Care Team:  Heath Velasco DO as PCP - General (Family Medicine)    Chief complaint: AMS    Subjective     Altered Mental Status     Pleasant 84 yo female with complicated PMH including dementia, PVD, D.CHF, DM who presents with AMS. Apparently baseline she uses walker/wheelchair and is normally oriented x 2-3 and communicates per family. Over past day she has had decreased responsiveness. EMS was called and apparently pt was hypotensive with EMS. Given IVFs in ER. Diagnosed with UTI and started on ceftriaxone. Slight improvement.    Pt does have a chronic wound to the right thigh. Was recently at [facility] and at rehab they are continuing wound care to it. Also during the hospitalization at [facility] she did have issues with bradycardia and hypotension.    She has had issues with recurrent UTIs including just finishing levaquin on 3/4/17 for UTI.     Of note history is obtained through records, discussion with family at bedside, and from ER physician.    Review of Systems   Unable to perform ROS: Mental status change        Past Medical History   Diagnosis Date   • Bradycardia 1/12/2017   • Cardiomyopathy 12/13/2013   • COPD (chronic obstructive pulmonary disease)    • Dementia 3/11/2017   • Diabetes mellitus    • Diastolic dysfunction 12/13/2013   • Hyperlipidemia 12/13/2013   • Hypertension    • Mitral valve insufficiency 12/13/2013   • Normocytic anemia 1/12/2017   • Open wound of thigh 1/12/2017   • Renal artery stenosis 12/13/2013   • Stenosis of carotid artery 12/13/2013   • TIA (transient ischemic attack)    • Type 2 diabetes mellitus 12/13/2013     Past Surgical History   Procedure Laterality Date   • Back surgery       Family History   Problem Relation Age of Onset   • Hypertension Other      Social History   Substance Use Topics   • Smoking status: Former Smoker     Packs/day: 1.00     Years: 15.00     Types: Cigarettes   • Smokeless tobacco: Not on file   • Alcohol use No      Prescriptions Prior to Admission   Medication Sig Dispense Refill Last Dose   • aspirin 81 MG chewable tablet Chew 81 mg Daily.      • atorvastatin (LIPITOR) 40 MG tablet Take 40 mg by mouth Daily.      • carvedilol (COREG) 12.5 MG tablet Take 12.5 mg by mouth 2 (Two) Times a Day With Meals.      • castor oil-balsam peru (VENELEX) ointment Apply 1 application topically 2 (Two) Times a Day. To heel blisters      • clopidogrel (PLAVIX) 75 MG tablet Take 75 mg by mouth Daily.      • collagenase 250 UNIT/GM ointment Apply 1 application topically Daily. ro wound on right thigh      • dakin's (HYSEPT) 0.25 % solution topical solution Apply 1 application topically Daily. To pack right thigh wound daily and cover with optifoam      • furosemide (LASIX) 20 MG tablet Take 20 mg by mouth Daily.      • glipiZIDE (GLUCOTROL) 5 MG tablet Take 2.5 mg by mouth Daily.      • HYDROcodone-acetaminophen (NORCO) 5-325 MG per tablet Take 1 tablet by mouth Every 6 (Six) Hours As Needed for moderate pain (4-6).      • isosorbide mononitrate (IMDUR) 60 MG 24 hr tablet Take 60 mg by mouth Daily.      • levothyroxine (SYNTHROID, LEVOTHROID) 88 MCG tablet Take 88 mcg by mouth Every Morning.      • lisinopril (PRINIVIL,ZESTRIL) 40 MG tablet Take 40 mg by mouth Daily.      • naproxen (NAPROSYN) 500 MG tablet Take 500 mg by mouth 2 (Two) Times a Day As Needed for Mild Pain (1-3).      • nitroglycerin (NITROSTAT) 0.4 MG SL tablet Place 0.4 mg under the tongue Every 5 (Five) Minutes As Needed for chest pain. Take no more than 3 doses in 15 minutes.      • raNITIdine (ZANTAC) 150 MG tablet Take 150 mg by mouth 2 (Two) Times a Day.        Allergies:  Review of patient's allergies indicates no known allergies.    Objective      Vital Signs  Temp:  [97.6 °F (36.4 °C)] 97.6 °F (36.4 °C)  Heart Rate:  [52-67] 54  Resp:  [18-20] 18  BP: (102-148)/(44-74) 134/50    Physical Exam   Constitutional: She appears well-developed and well-nourished. No  distress.   Acutely and chronically ill  Thin   HENT:   Head: Normocephalic and atraumatic.   Dry MM   Eyes: EOM are normal. Pupils are equal, round, and reactive to light. Right eye exhibits no discharge. Left eye exhibits no discharge. No scleral icterus.   Neck: Normal range of motion. Neck supple. No JVD present.   Cardiovascular: Normal rate, regular rhythm and normal heart sounds.    No murmur heard.  Pulmonary/Chest: Effort normal and breath sounds normal. No respiratory distress.   Abdominal: Soft. Bowel sounds are normal. She exhibits no distension. There is no tenderness.   Genitourinary:   Genitourinary Comments: Deferred    Musculoskeletal: She exhibits no edema.   Neurological: No cranial nerve deficit.   Awake but doesn't talk  Very drowisy  Will follow simple commands  Can move all extremities   Skin: Skin is warm and dry. Rash (large wound to right thigh. No real cellulitis but there is some purulent material in the wound along with grandulation tissue) noted.   Psychiatric: She is slowed. Cognition and memory are impaired.   Nursing note and vitals reviewed.      Results Review:   I reviewed the patient's new clinical results.    Protime-INR [[id number]] (Abnormal) Collected: 03/11/17 1647        Lab Status: Final result Specimen: Blood Updated: 03/11/17 1719        Protime 15.1 (H) Seconds         INR 1.24 (H)        Lactic Acid, Plasma [[id number]] (Abnormal) Collected: 03/11/17 1647       Lab Status: Final result Specimen: Blood Updated: 03/11/17 1723        Lactate 2.6 (C) mmol/L        Comprehensive Metabolic Panel [[id number]] (Abnormal) Collected: 03/11/17 1539       Lab Status: Final result Specimen: Blood Updated: 03/11/17 1622        Glucose 285 (H) mg/dL         BUN 43 (H) mg/dL         Creatinine 2.14 (H) mg/dL         Sodium 136 mmol/L         Potassium 5.3 (H) mmol/L         Chloride 96 (L) mmol/L         CO2 19.2 (L) mmol/L         Calcium 9.3 mg/dL         Total Protein 6.3 g/dL          Albumin 3.30 (L) g/dL         ALT (SGPT) 27 U/L         AST (SGOT) 29 U/L         Alkaline Phosphatase 89 U/L         Total Bilirubin 0.7 mg/dL         eGFR  José Amer 27 (L) mL/min/1.73         Globulin 3.0 gm/dL         A/G Ratio 1.1 g/dL         BUN/Creatinine Ratio 20.1         Anion Gap 20.8 mmol/L        Narrative:         The MDRD GFR formula is only valid for adults with stable renal function between ages 18 and 70.       CK [[id number]] (Abnormal) Collected: 03/11/17 1539       Lab Status: Final result Specimen: Blood Updated: 03/11/17 1622        Creatine Kinase 239 (H) U/L        Troponin [[id number]] (Abnormal) Collected: 03/11/17 1539       Lab Status: Final result Specimen: Blood Updated: 03/11/17 1625        Troponin T 0.049 (H) ng/mL        Narrative:         Troponin T Reference Ranges:  Less than 0.03 ng/mL:    Negative for AMI  0.03 to 0.09 ng/mL:      Indeterminant for AMI  Greater than 0.09 ng/mL: Positive for AMI       CBC Auto Differential [[id number]] (Abnormal) Collected: 03/11/17 1539       Lab Status: Final result Specimen: Blood Updated: 03/11/17 1608        WBC 12.76 (H) 10*3/mm3         RBC 3.61 (L) 10*6/mm3         Hemoglobin 10.6 (L) g/dL         Hematocrit 32.1 (L) %         MCV 88.9 fL         MCH 29.4 pg         MCHC 33.0 g/dL         RDW 15.6 (H) %         RDW-SD 51.0 fl         MPV 9.6 fL         Platelets 357 10*3/mm3         Neutrophil % 81.7 (H) %         Lymphocyte % 10.1 (L) %         Monocyte % 5.0 %         Eosinophil % 0.5 %         Basophil % 0.2 %         Immature Grans % 2.5 (H) %         Neutrophils, Absolute 10.42 (H) 10*3/mm3         Lymphocytes, Absolute 1.29 10*3/mm3         Monocytes, Absolute 0.64 10*3/mm3         Eosinophils, Absolute 0.06 10*3/mm3         Basophils, Absolute 0.03 10*3/mm3         Immature Grans, Absolute 0.32 (H) 10*3/mm3         nRBC 0.0 /100 WBC        Scan Slide [[id number]] Collected: 03/11/17 1539       Lab Status: Final result Specimen: Blood  Updated: 03/11/17 1608        Anisocytosis Mod/2+         Crenated RBC's Slight/1+         Schistocytes Slight/1+         Spherocytes Slight/1+         WBC Morphology Normal         Platelet Morphology Normal        Urinalysis With / Culture If Indicated [[id number]] (Abnormal) Collected: 03/11/17 1359       Lab Status: Final result Specimen: Urine from Urine, Catheter Updated: 03/11/17 1420        Color, UA Dark Yellow (A)         Appearance, UA Cloudy (A)         pH, UA 5.5         Specific Gravity, UA 1.020         Glucose, UA Negative         Ketones, UA Trace (A)         Bilirubin, UA Negative         Blood, UA Small (1+) (A)         Protein, UA Trace (A)         Leuk Esterase, UA Large (3+) (A)         Nitrite, UA Negative         Urobilinogen, UA 1.0 E.U./dL        Urinalysis, Microscopic Only [[id number]] (Abnormal) Collected: 03/11/17 1359       Lab Status: Final result Specimen: Urine from Urine, Catheter Updated: 03/11/17 1440        RBC, UA 0-2 /HPF         WBC, UA Too Numerous to Count (A) /HPF         Bacteria, UA 3+ (A) /HPF         Squamous Epithelial Cells, UA 7-12 (A) /HPF         Hyaline Casts, UA 13-20 /LPF         Methodology Manual Light Microscopy       CT Head Without Contrast [[id number]] Not Reviewed        Order Status: Completed Collected: 03/11/17 1519        Updated: 03/11/17 1551       Narrative:         CT HEAD WITHOUT CONTRAST     HISTORY: Unresponsive.     TECHNIQUE: Head CT includes axial imaging from the base of the skull to  vertex without IV contrast.     COMPARISON: Brain MRI with and without contrast 11/08/2010, head CT  without contrast 11/07/2010.     FINDINGS: There is moderately extensive chronic small vessel ischemic  white matter change, as well as cerebral atrophy. There are no abnormal  areas of increased attenuation intraaxial to suggest hemorrhage.  Ventricular enlargement is attributed to central atrophy. There is no  evidence for mass effect or shift of the midline  structures. In the  right cerebellar hemisphere, there is a 16 x 16 mm chronic infarction.  No extraaxial fluid collection is observed. Mastoid air cells and  visualized paranasal sinuses appear clear.           Impression:         Chronic small vessel ischemic white matter change. Chronic  right cerebral hemisphere infarction. No evidence for hemorrhage or  acute intracranial abnormality.     This report was finalized on 3/11/2017 3:48 PM by Dr. Agustin Wang MD.          XR Chest 1 View [[id number]] Not Reviewed       Order Status: Completed Collected: 03/11/17 1449        Updated: 03/11/17 1559       Narrative:         AP CHEST     HISTORY: Weakness and shortness of air.      COMPARISON: AP chest 11/07/2010, AP and lateral chest 06/30/2010.     FINDINGS: Heart size is mildly enlarged. Aortic vascular calcifications  are present. There is a right peritracheal calcified granuloma. There  appears to be mild pulmonary arterial enlargement. No focal airspace  disease is evident. There is no evidence of pulmonary edema or pleural  effusion. The bones appear osteopenic.          Impression:         Pulmonary arterial enlargement. Atherosclerotic disease. No  evidence for acute disease.            Assessment/Plan     Principal Problem:    SUSIE (acute kidney injury)  Active Problems:    Urinary tract infection    Type 2 diabetes mellitus    Open wound of thigh    Metabolic encephalopathy    Dementia    -IVFs, monitor renal fx (caution with history of reported CHF but family says not much of an issue)  -Infection likely UTI but also could be related to leg wound (less likely as does appear to be healing and family feels it is improved from last month). Will treat with IV ceftriaxone pending cultures. Will also consult ID with sounds like recurrent UTIs. Plastic Surgery consult as well for optimal wound care and if surgery should be considered.  -Add SSI for DM  -Closely monitor labs and vital signs  -ST as failed RN  bedside swallow    Full code per family    Assessment & Plan    I discussed the patients findings and my recommendations with patient, family and consulting provider    Hari Alvarez MD  03/11/17  10:06 PM

## 2017-03-12 NOTE — PROGRESS NOTES
[facility]     LOS: 1 day     Subjective:  Nonverbal for me    Confused and not reliable historian    Objective:    Vital Signs:  Temp:  [97.6 °F (36.4 °C)] 97.6 °F (36.4 °C)  Heart Rate:  [51-67] 63  Resp:  [16-20] 16  BP: (102-159)/(44-74) 159/45    General Appearance: no acute distress, appears comfortable  Heart: regular rate and rhythm  Lungs: clear to auscultation bilaterally, respirations unlabored, good air entry  Abdomen: soft, non-tender, no guarding, no rebound, non-distended  Extremities: no edema, no cyanosis  Neurology: speech normal, CN grossly normal  Psychiatric: normal mood and affect    Results Review:    GLUCOSE   Date Value Ref Range Status   03/12/2017 125 (H) 65 - 99 mg/dL Final   03/11/2017 285 (H) 65 - 99 mg/dL Final       Results from last 7 days  Lab Units 03/11/17  1539   WBC 10*3/mm3 12.76*   HEMOGLOBIN g/dL 10.6*   HEMATOCRIT % 32.1*   PLATELETS 10*3/mm3 357       Results from last 7 days  Lab Units 03/12/17  0849 03/11/17  1539   SODIUM mmol/L 138 136   POTASSIUM mmol/L 4.6 5.3*   CHLORIDE mmol/L 105 96*   TOTAL CO2 mmol/L 18.3* 19.2*   BUN mg/dL 35* 43*   CREATININE mg/dL 1.21* 2.14*   CALCIUM mg/dL 8.4* 9.3   BILIRUBIN mg/dL  --  0.7   ALK PHOS U/L  --  89   ALT (SGPT) U/L  --  27   AST (SGOT) U/L  --  29   GLUCOSE mg/dL 125* 285*       Results from last 7 days  Lab Units 03/11/17  1647   INR  1.24*             Results from last 7 days  Lab Units 03/11/17  1539   CK TOTAL U/L 239*   TROPONIN T ng/mL 0.049*                        I have reviewed daily medications and changes in CPOE    Scheduled meds    aspirin 81 mg Oral Daily   atorvastatin 40 mg Oral Daily   carvedilol 12.5 mg Oral BID With Meals   castor oil-balsam peru 1 application Topical Q12H   ceftriaxone 2 g Intravenous Q24H   clopidogrel 75 mg Oral Daily   collagenase 1 application Topical Daily   dakin's 1 application Topical Q24H   enoxaparin 30 mg Subcutaneous Daily   famotidine 20 mg Oral  BID   insulin aspart 0-7 Units Subcutaneous 4x Daily AC & at Bedtime   isosorbide mononitrate 60 mg Oral Daily   levothyroxine 88 mcg Oral Q AM   sodium chloride 1,000 mL Intravenous Once         sodium chloride 125 mL/hr Last Rate: 125 mL/hr (03/12/17 0041)     PRN meds  •  acetaminophen  •  dextrose  •  dextrose  •  glucagon (human recombinant)  •  HYDROcodone-acetaminophen  •  nitroglycerin  •  ondansetron **OR** ondansetron ODT **OR** ondansetron  •  sodium chloride    Assessment:    Principal Problem:    SUSIE (acute kidney injury)  Active Problems:    Urinary tract infection    Type 2 diabetes mellitus    Open wound of thigh    Metabolic encephalopathy    Dementia      Plan:  Iv fluids  Rocephin  ssi  ST     Id consulted    Called daughter and no answer      ECHO from [facility] 1/2017  Troponin elevated, ekg is ok  The left ventricular chamber size is normal.  Mild concentric left ventricular hypertrophy is observed.  There is normal left ventricular systolic function.  The EF 4ch was calculated at 76%.  Abnormal left ventricular diastolic filling is observed, consistent with  impaired relaxation.   Mild aortic cusp sclerosis is present.  There is mild aortic stenosis.  The aortic valve area, by VTI's, is calculated at 1.85 cm2.   There is a trace of aortic regurgitation.  There is trivial to mild mitral regurgitation observed.  There is a trace tricuspid regurgitation.   There is a small pericardial effusion.      Thank you for the courtesy of this referral.  Electronically signed by:  _______________________________  James Kammerling MD     01/12/2017 20:01:36  Amos Linares MD  03/12/17  10:21 AM

## 2017-03-12 NOTE — PLAN OF CARE
Problem: Inpatient Physical Therapy  Goal: Bed Mobility Goal LTG- PT    03/12/17 1510   Bed Mobility PT LTG   Bed Mobility PT LTG, Date Established 03/12/17   Bed Mobility PT LTG, Time to Achieve 1 wk   Bed Mobility PT LTG, Activity Type all bed mobility   Bed Mobility PT LTG, [place] Level minimum assist (75% patient effort)

## 2017-03-12 NOTE — PROGRESS NOTES
Infectious Diseases Progress Note    Diane Valdes MD     [facility]  [place]: 1 day  Patient Identification:  Name: Carley Madden  Age: 83 y.o.  Sex: female  :  1933  MRN: [id number]         Primary Care Physician: Heath Velasco DO            Subjective: Feeling better more oriented  Interval History: See consultation note   Objective:    Scheduled Meds:  aspirin 81 mg Oral Daily   atorvastatin 40 mg Oral Daily   carvedilol 12.5 mg Oral BID With Meals   castor oil-balsam peru 1 application Topical Q12H   ceftriaxone 2 g Intravenous Q24H   clopidogrel 75 mg Oral Daily   collagenase 1 application Topical Daily   dakin's 1 application Topical Q24H   enoxaparin 30 mg Subcutaneous Daily   famotidine 20 mg Oral BID   insulin aspart 0-7 Units Subcutaneous 4x Daily AC & at Bedtime   isosorbide mononitrate 60 mg Oral Daily   levothyroxine 88 mcg Oral Q AM   sodium chloride 1,000 mL Intravenous Once     Continuous Infusions:  sodium chloride 125 mL/hr Last Rate: 125 mL/hr (17 0041)       Vital signs in last 24 hours:  Temp:  [97.6 °F (36.4 °C)] 97.6 °F (36.4 °C)  Heart Rate:  [51-67] 63  Resp:  [16-20] 16  BP: (102-159)/(44-74) 159/45    Intake/Output:  No intake or output data in the 24 hours ending 17 1123    Exam:  Visit Vitals   • /45 (BP Location: Left arm, Patient Position: Lying)   • Pulse 63   • Temp 97.6 °F (36.4 °C) (Oral)   • Resp 16   • SpO2 94%       General Appearance:    More awake and interactive in last night                          Head:    Normocephalic, without obvious abnormality, atraumatic                           Eyes:    PERRL, conjunctiva/corneas clear, EOM's intact, both eyes                         Throat:   Lips, tongue, gums normal; oral mucosa pink and moist                           Neck:   Supple, symmetrical, trachea midline, no JVD                         Lungs:    Clear to auscultation bilaterally, respirations unlabored                 Chest  Wall:    No tenderness or deformity                          Heart:    Regular rate and rhythm, S1 and S2 normal, no murmur,no  Rub                                      or gallop                  Abdomen:     Soft, non-tender, bowel sounds active, no masses, no                                                        organomegaly                  Extremities:   Right thigh wound appears to be non-cellulitic dark eschar at the distal margin.                        Pulses:   Pulses palpable in all extremities                            Skin:   Skin is warm and dry,  no rashes or palpable lesions                        Data Review:    I reviewed the patient's new clinical results.    Results from last 7 days  Lab Units 03/12/17  1000 03/11/17  1539   WBC 10*3/mm3 10.55 12.76*   HEMOGLOBIN g/dL 8.5* 10.6*   PLATELETS 10*3/mm3 370 357       Results from last 7 days  Lab Units 03/12/17  0849 03/11/17  1539   SODIUM mmol/L 138 136   POTASSIUM mmol/L 4.6 5.3*   CHLORIDE mmol/L 105 96*   TOTAL CO2 mmol/L 18.3* 19.2*   BUN mg/dL 35* 43*   CREATININE mg/dL 1.21* 2.14*   CALCIUM mg/dL 8.4* 9.3   GLUCOSE mg/dL 125* 285*     Urine culture is pending    Assessment:  Principal Problem:    SUSIE (acute kidney injury)  Active Problems:    Urinary tract infection    Type 2 diabetes mellitus    Open wound of thigh    Metabolic encephalopathy    Dementia      Plan:  Continue Rocephin and follow up on the urine culture results.  Continue local care of the wound.  Does not appear to have any obvious infection involving the wound at this time.  Diane Valdes MD  3/12/2017  11:23 AM    Much of this encounter note is an electronic transcription/translation of spoken language to printed text. The electronic translation of spoken language may permit erroneous, or at times, nonsensical words or phrases to be inadvertently transcribed; Although I have reviewed the note for such errors, some may still exist

## 2017-03-13 NOTE — PROGRESS NOTES
Acute Care - Speech Language Pathology   Swallow Initial Evaluation [facility]     Patient Name: Carley Madden  : 1933  MRN: [id number]  Today's Date: 3/13/2017  Onset of Illness/Injury or Date of Surgery Date: 17            Admit Date: 3/11/2017    SPEECH-LANGUAGE PATHOLOGY EVALUATION - SWALLOW  Subjective: The patient was seen on this date for a Clinical Swallow evaluation.  Patient was alert and cooperative.  Limited verbalizations.   The patient's history is significant for dementia. Admitted w/ TME due to UTI.    Objective: Textures given included thin liquid, nectar thick liquid, puree consistency, mechanical soft consistency and regular consistency.  Assessment: Wet gurgly voicing noted after trials of thin liquids.  No difficulty noted w/ nectar thick by cup or straw.  Mastication appeared WFL, however pt had mild oral residue w/ regular textures, much better w/ mech soft.   SLP Findings:  Patient presents with mild oropharyngeal dysphagia, without esophageal component.   Recommendations: Diet Textures: nectar thick liquid, mechanical soft consistency food.  Medications should be taken whole with puree. May have ice between meals after oral care, under staff or family supervision and with the recommended strategies for safe swallowing.   Recommended Strategies: Upright for PO and small bites and sips. Oral care before breakfast, after all meals and PRN.  Other Recommended Evaluations: Re-evaluation at bedside  In 1-2 days    Visit Dx:     ICD-10-CM ICD-9-CM   1. Urinary tract infection, site unspecified N39.0    2. Altered mental status, unspecified altered mental status type R41.82 780.97   3. Sepsis, due to unspecified organism A41.9 038.9     995.91   4. Acute renal failure, unspecified acute renal failure type N17.9 584.9   5. Dementia without behavioral disturbance, unspecified dementia type F03.90 294.20   6. Decreased ambulation status R68.89 780.99     Patient Active Problem List    Diagnosis   • Urinary tract infection   • SUSIE (acute kidney injury)   • Benign essential hypertension   • Bradycardia   • Cardiomyopathy   • Stenosis of carotid artery   • Diastolic dysfunction   • Type 2 diabetes mellitus   • Hyperlipidemia   • Mitral valve insufficiency   • Normocytic anemia   • Open wound of thigh   • Renal artery stenosis   • Metabolic encephalopathy   • Dementia     Past Medical History   Diagnosis Date   • Bradycardia 1/12/2017   • Cardiomyopathy 12/13/2013   • COPD (chronic obstructive pulmonary disease)    • Dementia 3/11/2017   • Diabetes mellitus    • Diastolic dysfunction 12/13/2013   • Hyperlipidemia 12/13/2013   • Hypertension    • Mitral valve insufficiency 12/13/2013   • Normocytic anemia 1/12/2017   • Open wound of thigh 1/12/2017   • Renal artery stenosis 12/13/2013   • Stenosis of carotid artery 12/13/2013   • Stroke    • TIA (transient ischemic attack)    • Type 2 diabetes mellitus 12/13/2013     Past Surgical History   Procedure Laterality Date   • Back surgery            SWALLOW EVALUATION (last 72 hours)      Swallow Evaluation       03/13/17 1207                Rehab Evaluation    Document Type evaluation  -NB        Symptoms Noted During/After Treatment none  -NB        General Information    Patient Profile Review yes  -NB        Current Diet Limitations thin liquids;puree  -NB        Precautions/Limitations, Vision WFL with corrective lenses  -NB        Precautions/Limitations, Hearing WFL  -NB        Prior Level of Function- Communication limited to basic wants/needs  -NB        Prior Level of Function- Swallowing no diet consistency restrictions  -NB        Plans/Goals Discussed With patient;agreed upon  -NB        Barriers to Rehab cognitive status  -NB        Clinical Impression    Patient's Goals For Discharge patient did not state  -NB        SLP Swallowing Diagnosis mild dysphagia;oral dysfunction;pharyngeal dysfunction  -NB        Rehab Potential/Prognosis,  Swallowing good, to achieve stated therapy goals  -NB        Criteria for Skilled Therapeutic Interventions Met skilled criteria for dysphagia intervention met  -NB        Therapy Frequency PRN  -NB        Predicted Duration Therapy Interv (days) until discharge  -NB        Expected Duration Therapy Session (min) 15-30 minutes  -NB        SLP Diet Recommendation soft textures;ground;nectar/syrup-thick liquids  -NB        Recommended Diagnostics reassess via clinical swallow (non-instrumental exam)  -NB        Recommended Feeding/Eating Techniques small sips/bites  -NB        SLP Rec. for Method of Medication Administration meds whole in pudding/applesauce  -NB        Monitor For Signs Of Aspiration cough;gurgly voice;throat clearing  -NB        Anticipated Discharge Disposition skilled nursing facility  -NB        Pain Assessment    Pain Assessment No/denies pain  -NB        Cognitive Assessment/Intervention    Current Cognitive/Communication Assessment impaired  -NB        Orientation Status oriented to;person  -NB        Follows Commands/Answers Questions 50% of the time;able to follow single-step instructions  -NB        Oral Motor Structure and Function    Oral Motor Anatomy and Physiology patient demonstrates anatomy that is WNL  -NB        Dentition Assessment upper dentures/partial in place  -NB        Secretion Management WNL/WFL  -NB        Mucosal Quality moist, healthy  -NB        Velar Elevation WNL (within normal limits)  -NB        Volitional Swallow no difficulties initiating volitional swallow  -NB        Volitional Cough no difficulties initiating volitional cough  -NB        Oral Musculature General Assessment WNL (within normal limits)  -NB          User Key  (r) = Recorded By, (t) = Taken By, (c) = Cosigned By    Initials Name Effective Dates    SHERMAN Courtney MS CCC-SLP 04/13/15 -         EDUCATION  The patient has been educated in the following areas:   Dysphagia (Swallowing  Impairment).    SLP Recommendation and Plan  SLP Swallowing Diagnosis: mild dysphagia, oral dysfunction, pharyngeal dysfunction  SLP Diet Recommendation: soft textures, ground, nectar/syrup-thick liquids  Recommended Feeding/Eating Techniques: small sips/bites  SLP Rec. for Method of Medication Administration: meds whole in pudding/applesauce  Monitor For Signs Of Aspiration: cough, gurgly voice, throat clearing  Recommended Diagnostics: reassess via clinical swallow (non-instrumental exam)  Criteria for Skilled Therapeutic Interventions Met: skilled criteria for dysphagia intervention met  Anticipated Discharge Disposition: skilled nursing facility  Rehab Potential/Prognosis, Swallowing: good, to achieve stated therapy goals  Therapy Frequency: PRN             Plan of Care Review  Plan Of Care Reviewed With: patient  Progress: progress toward functional goals as expected  Outcome Summary/Follow up Plan: BSE: start mech soft w/ nectar thick.  Will re-eval in 1-2 days          IP SLP Goals       03/13/17 1205          Safely Consume Diet    Safely Consume Diet- SLP, Date Established 03/13/17  -NB      Safely Consume Diet- SLP, Time to Achieve by discharge  -NB      Safely Consume Diet- SLP, Additional Goal mech soft w/ nectar thick  -NB      Safely Consume Diet- SLP, Date Goal Reviewed 03/13/17  -NB        User Key  (r) = Recorded By, (t) = Taken By, (c) = Cosigned By    Initials Name Provider Type    SHERMAN Courtney MS CCC-SLP Speech and Language Pathologist             SLP Outcome Measures (last 72 hours)      SLP Outcome Measures       03/13/17 1206          SLP Outcome Measures    Outcome Measure Used? Adult NOMS  -NB      FCM Scores    FCM Chosen Swallowing  -NB      Swallowing FCM Score 4  -NB        User Key  (r) = Recorded By, (t) = Taken By, (c) = Cosigned By    Initials Name Effective Dates    SHERMAN Courtney MS CCC-SLP 04/13/15 -            Time Calculation:         Time Calculation- SLP       03/13/17  1212          Time Calculation- SLP    SLP Received On 03/13/17  -NB        User Key  (r) = Recorded By, (t) = Taken By, (c) = Cosigned By    Initials Name Provider Type    SHERMAN Courtney MS CCC-SLP Speech and Language Pathologist          Therapy Charges for Today     Code Description Service Date Service Provider Modifiers Qty    [id number] HC ST EVAL ORAL PHARYNG SWALLOW 4 3/13/2017 Elissa Courtney MS CCC-SLP GN 1               MS CRISTOPHER Greenberg  3/13/2017

## 2017-03-13 NOTE — PROGRESS NOTES
Discharge Planning Assessment  [facility]     Patient Name: Carley Madden  MRN: [id number]  Today's Date: 3/13/2017    Admit Date: 3/11/2017          Discharge Needs Assessment       03/13/17 1246    Living Environment    Lives With facility resident   [facility] [place]    Living Arrangements extended care facility    Provides Primary Care For no one, unable/limited ability to care for self    Quality Of Family Relationships supportive    Able to Return to Prior Living Arrangements yes    Discharge Needs Assessment    Concerns To Be Addressed basic needs concerns    Readmission Within The Last 30 Days no previous admission in last 30 days    Equipment Currently Used at Home walker, rolling;wheelchair    Transportation Available ambulance            Discharge Plan       03/13/17 1247    Case Management/Social Work Plan    Plan [facility]    Patient/Family In Agreement With Plan yes    Additional Comments S/W daughter, Yunier Cardenas, over the telephone and verified face sheet.  IMM noted.  Yunier states that the patient was in the process of transferring to [facility] from [facility] and then was admitted to [facility].  Plan is to go to [facility] on d/c, notified Minerva/Malcolm and she will follow.  Will continue to monitor and assist, as needed.  Partial packet made.  JEAN MARIE Camara, CCP        Discharge Placement     Facility/Agency Request Status Selected? Address Phone Number Fax Number    [facility] Accepted    Yes [address], [facility] [address] [phone]    [facility] [place] [facility] P[facility] - Request Sent     [address] [facility] [facility] [address] [phone]        Torey Frias RN 3/12/2017 08:14    Email to [facility] to follow Torey Frias RN                             Demographic Summary       03/13/17 1248    Referral Information    Admission Type inpatient    Arrived From long-term [facility]   [facility] [place]    Referral Source  admission list    Reason For Consult discharge planning    Record Reviewed plan of care    Contact Information    Permission Granted to Share Information With family/designee   Yunier Cardenas, dtr [phone]    Primary Care Physician Information    Name Dr. Heath Velasco            Functional Status       03/13/17 6095    Functional Status Current    Ambulation 3-->assistive equipment and person    Transferring 3-->assistive equipment and person    Toileting 3-->assistive equipment and person    Bathing 3-->assistive equipment and person    Dressing 3-->assistive equipment and person    Eating 2-->assistive person    Communication 2-->difficulty understanding and speaking (not related to language barrier)    Swallowing (if score 2 or more for any item, consult Rehab Services) 2-->difficulty swallowing liquids/foods    Functional Status Prior    Ambulation 3-->assistive equipment and person    Transferring 3-->assistive equipment and person    Toileting 3-->assistive equipment and person    Bathing 3-->assistive equipment and person    Dressing 3-->assistive equipment and person    Eating 2-->assistive person    Communication 2-->difficulty understanding (not related to language barrier)    Swallowing 2-->difficulty swallowing liquids/foods    IADL    Medications completely dependent    Meal Preparation completely dependent    Housekeeping completely dependent    Laundry completely dependent    Shopping completely dependent    Oral Care independent    Cognitive/Perceptual/Developmental    Current Mental Status/Cognitive Functioning unable to assess            Psychosocial     None            Abuse/Neglect     None            Legal     None            Substance Abuse     None            Patient Forms     None          Arminda Armendariz RN

## 2017-03-13 NOTE — CONSULTS
"Adult Nutrition  Assessment/PES    Patient Name:  Carley Madden  YOB: 1933  MRN: [id number]  Admit Date:  3/11/2017    Assessment Date:  3/13/2017        Reason for Assessment       03/13/17 1453    Reason for Assessment    Reason For Assessment/Visit admission assessment;identified at risk by screening criteria    Identified At Risk By Screening Criteria MST SCORE 2+;unintentional loss of 10 lbs or more in the past 2 mos    Diagnosis Diagnosis    Cardiac HTN;Cardiomyopathy    Endocrine DM Type 2    Infectious Disease UTI    Neurological Dementia;TIA    Pulmonary/Critical Care COPD    Factors Affecting Nutrition --   confused, just stares when I try to talk to her - does not respond to questions                Anthropometrics       03/13/17 1455    Anthropometrics    Height 157.5 cm (62.01\")    Weight 60.8 kg (134 lb 0.6 oz)    Anthropometrics (Special Considerations)    RD Calculated BMI (kg/m2) 24.51    Ideal Body Weight (IBW)    Ideal Body Weight (IBW), Female 50.85    % Ideal Body Weight 119.82    Body Mass Index (BMI)    BMI (kg/m2) 24.56    BMI Grade 19.1 - 24.9 - normal            Labs/Tests/Procedures/Meds       03/13/17 1455    Labs/Tests/Procedures/Meds    Diagnostic Test/Procedure Review reviewed    Labs/Tests Review Reviewed;Glucose    Medication Review Reviewed, pertinent;Antacid;Insulin   vit B12    Significant Vitals reviewed            Physical Findings       03/13/17 1456    Physical Findings/Assessment    Additional Documentation Physical Appearance (Group)    Physical Appearance    Skin --   R lateral thigh laceration, B=14              Nutrition Prescription Ordered       03/13/17 1456    Nutrition Prescription PO    Current PO Diet Dysphagia    Dysphagia Level 4  Mechanical soft no mixed consistencies    Fluid Consistency Nectar/syrup thick    Common Modifiers Consistent Carbohydrate            Evaluation of Received Nutrient/Fluid Intake       03/13/17 1456    PO Evaluation "    Number of Meals 1    % PO Intake 50              Problem/Interventions:        Problem 1       03/13/17 1457    Nutrition Diagnoses Problem 1    Problem 1 Inadequate Intake/Infusion    Inadequate Intake Type Oral    Macronutrient Kcal;Protein    Etiology (related to) Medical Diagnosis;Factors Affecting Nutrition    Neurological Dementia    Mental State/Condition Confusion    Signs/Symptoms (evidenced by) Report/Observation;PO Intake    Percent (%) intake recorded 50 %    Over number of meals 1                    Intervention Goal       03/13/17 1457    Intervention Goal    General Maintain nutrition    PO Increase intake;PO intake (%)    PO Intake % 75 %    Weight Maintain weight            Nutrition Intervention       03/13/17 1458    Nutrition Intervention    RD/Tech Action Follow Tx progress;Care plan reviewd;Recommend/ordered    Recommended/Ordered Supplement            Nutrition Prescription       03/13/17 1458    Nutrition Prescription PO    PO Prescription Begin/change supplement    Supplement Magic Cup    Supplement Frequency 2 times a day    New PO Prescription Ordered? Yes            Education/Evaluation       03/13/17 1458    Education    Education Will Instruct as appropriate    Monitor/Evaluation    Monitor Per protocol;PO intake;Supplement intake;Weight;Skin status;Symptoms        Comments:  Attempted to speak with patient who seems confused.  Does not respond to questions when I ask them.  50% x 1 meal.  Ordered Magic Cup BID.  Will continue to follow.    Electronically signed by:  Juanis Prince RD  03/13/17 2:59 PM

## 2017-03-13 NOTE — PROGRESS NOTES
Infectious Diseases Progress Note    Diane Valdes MD     [facility]  [place]: 2 days  Patient Identification:  Name: Carley Madden  Age: 83 y.o.  Sex: female  :  1933  MRN: [id number]         Primary Care Physician: Heath Velasco, DO            Subjective: Feels better but still confused  Interval History: See consultation note   Objective:    Scheduled Meds:    aspirin 81 mg Oral Daily   atorvastatin 40 mg Oral Daily   carvedilol 12.5 mg Oral BID With Meals   castor oil-balsam peru 1 application Topical Q12H   cefTRIAXone 2 g Intramuscular Q24H   clopidogrel 75 mg Oral Daily   collagenase 1 application Topical Daily   dakin's 1 application Topical Q24H   enoxaparin 30 mg Subcutaneous Daily   famotidine 20 mg Oral BID   insulin aspart 0-7 Units Subcutaneous 4x Daily AC & at Bedtime   isosorbide mononitrate 60 mg Oral Daily   levothyroxine 88 mcg Oral Q AM   sodium chloride 1,000 mL Intravenous Once   vitamin B-12 1,000 mcg Oral Daily     Continuous Infusions:    sodium chloride 125 mL/hr Last Rate: 125 mL/hr (17 1225)       Vital signs in last 24 hours:  Temp:  [97.3 °F (36.3 °C)-98.6 °F (37 °C)] 97.3 °F (36.3 °C)  Heart Rate:  [54-65] 65  Resp:  [18-20] 18  BP: (137-174)/(52-60) 174/60    Intake/Output:    Intake/Output Summary (Last 24 hours) at 17 0956  Last data filed at 17 1225   Gross per 24 hour   Intake   1220 ml   Output      0 ml   Net   1220 ml       Exam:  Visit Vitals   • /60   • Pulse 65   • Temp 97.3 °F (36.3 °C)   • Resp 18   • SpO2 99%       General Appearance:    More awake and interactive in last night                          Head:    Normocephalic, without obvious abnormality, atraumatic                           Eyes:    PERRL, conjunctiva/corneas clear, EOM's intact, both eyes                         Throat:   Lips, tongue, gums normal; oral mucosa pink and moist                           Neck:   Supple, symmetrical, trachea midline, no JVD                          Lungs:    Clear to auscultation bilaterally, respirations unlabored                 Chest Wall:    No tenderness or deformity                          Heart:    Regular rate and rhythm, S1 and S2 normal, no murmur,no  Rub                                      or gallop                  Abdomen:     Soft, non-tender, bowel sounds active, no masses, no                                                        organomegaly                  Extremities:   Right thigh wound appears to be showing erythematous changes along the margin of dark eschar                         Pulses:   Pulses palpable in all extremities                            Skin:   Skin is warm and dry,  no rashes or palpable lesions                        Data Review:    I reviewed the patient's new clinical results.    Results from last 7 days  Lab Units 03/12/17  1000 03/11/17  1539   WBC 10*3/mm3 10.55 12.76*   HEMOGLOBIN g/dL 8.5* 10.6*   PLATELETS 10*3/mm3 370 357       Results from last 7 days  Lab Units 03/12/17  0849 03/11/17  1539   SODIUM mmol/L 138 136   POTASSIUM mmol/L 4.6 5.3*   CHLORIDE mmol/L 105 96*   TOTAL CO2 mmol/L 18.3* 19.2*   BUN mg/dL 35* 43*   CREATININE mg/dL 1.21* 2.14*   CALCIUM mg/dL 8.4* 9.3   GLUCOSE mg/dL 125* 285*     Urine culture no growth so far  Assessment:  Principal Problem:    SUSIE (acute kidney injury)  Active Problems:    Urinary tract infection    Type 2 diabetes mellitus    Open wound of thigh    Metabolic encephalopathy    Dementia      Plan:  Continue Rocephin and follow up on the urine culture results.  Continue local care of the wound.  There is little more prominent erythema along the peripheral margin of the necrotic area.  Would check on culture.    Diane Valdes MD  3/13/2017  9:56 AM    Much of this encounter note is an electronic transcription/translation of spoken language to printed text. The electronic translation of spoken language may permit erroneous, or at times, nonsensical  words or phrases to be inadvertently transcribed; Although I have reviewed the note for such errors, some may still exist

## 2017-03-13 NOTE — PROGRESS NOTES
Nutrition Services    Patient Name:  Carley Madden  YOB: 1933  MRN: [id number]  Admit Date:  3/11/2017        Wilner count order received.  Est kcal needs: 5447-8284 per day.  Est protein needs: 61 grams per day.  Will continue to follow for wilner count results.      Electronically signed by:  Juanis Prince RD  03/13/17 3:04 PM

## 2017-03-13 NOTE — PLAN OF CARE
Problem: Patient Care Overview (Adult)  Goal: Plan of Care Review    03/13/17 1205   Coping/Psychosocial Response Interventions   Plan Of Care Reviewed With patient   Patient Care Overview   Progress progress toward functional goals as expected   Outcome Evaluation   Outcome Summary/Follow up Plan BSE: start mech soft w/ nectar thick. Will re-eval in 1-2 days         Problem: Inpatient SLP  Goal: Dysphagia- Patient will safely consume diet as per recommendation with no signs/symptoms of aspiration    03/13/17 1205   Safely Consume Diet   Safely Consume Diet- SLP, Date Established 03/13/17   Safely Consume Diet- SLP, Time to Achieve by discharge   Safely Consume Diet- SLP, Additional Goal mech soft w/ nectar thick   Safely Consume Diet- SLP, Date Goal Reviewed 03/13/17

## 2017-03-13 NOTE — PLAN OF CARE
Problem: Patient Care Overview (Adult)  Goal: Plan of Care Review  Outcome: Ongoing (interventions implemented as appropriate)    03/13/17 1105   Coping/Psychosocial Response Interventions   Plan Of Care Reviewed With patient   Patient Care Overview   Progress progress toward functional goals is gradual   Outcome Evaluation   Outcome Summary/Follow up Plan Pt is making slow progress due to cognition

## 2017-03-13 NOTE — PLAN OF CARE
Problem: Patient Care Overview (Adult)  Goal: Plan of Care Review  Outcome: Ongoing (interventions implemented as appropriate)    03/13/17 3994   Coping/Psychosocial Response Interventions   Plan Of Care Reviewed With patient   Patient Care Overview   Progress progress toward functional goals as expected   Outcome Evaluation   Outcome Summary/Follow up Plan Dsg changed. Wound cultured. Pt given pain meds x1 today. Will continue to monitor

## 2017-03-13 NOTE — PROGRESS NOTES
Acute Care - Physical Therapy Treatment Note  [facility]     Patient Name: Carley Madden  : 1933  MRN: [id number]  Today's Date: 3/13/2017  Onset of Illness/Injury or Date of Surgery Date: 17  Date of Referral to PT: 17  Referring Physician: LEIA YEH    Admit Date: 3/11/2017    Visit Dx:    ICD-10-CM ICD-9-CM   1. Urinary tract infection, site unspecified N39.0    2. Altered mental status, unspecified altered mental status type R41.82 780.97   3. Sepsis, due to unspecified organism A41.9 038.9     995.91   4. Acute renal failure, unspecified acute renal failure type N17.9 584.9   5. Dementia without behavioral disturbance, unspecified dementia type F03.90 294.20   6. Decreased ambulation status R68.89 780.99     Patient Active Problem List   Diagnosis   • Urinary tract infection   • SUSIE (acute kidney injury)   • Benign essential hypertension   • Bradycardia   • Cardiomyopathy   • Stenosis of carotid artery   • Diastolic dysfunction   • Type 2 diabetes mellitus   • Hyperlipidemia   • Mitral valve insufficiency   • Normocytic anemia   • Open wound of thigh   • Renal artery stenosis   • Metabolic encephalopathy   • Dementia               Adult Rehabilitation Note       17 1100          Rehab Assessment/Intervention    Discipline physical therapy assistant  -CW      Document Type therapy note (daily note)  -CW      Subjective Information agree to therapy;decreased LOC  -CW      Patient Effort, Rehab Treatment poor  -CW      Precautions/Limitations fall precautions  -CW      Recorded by [CW] Isidro Hopkins      Pain Assessment    Pain Assessment Kauffman-Albert FACES  -CW      Kauffman-Baker FACES Pain Rating 6  -CW      Pain Type Surgical pain  -CW      Pain Location Leg  -CW      Pain Orientation Right;Proximal  -CW      Pain Intervention(s) Medication (See MAR);Repositioned  -CW      Recorded by [CW] Isidro Hopkins      Cognitive Assessment/Intervention    Current  Cognitive/Communication Assessment impaired  -CW      Orientation Status oriented to;situation  -CW      Follows Commands/Answers Questions able to follow single-step instructions;needs cueing;needs increased time;needs repetition  -CW      Personal Safety moderate impairment;decreased awareness, need for safety;decreased insight to deficits  -CW      Recorded by [CW] Isidro Hopkins      Bed Mobility, Assessment/Treatment    Bed Mobility, Assistive Device draw sheet;head of bed elevated  -CW      Bed Mob, Supine to Sit, [place] verbal cues required;nonverbal cues required (demo/gesture);moderate assist (50% patient effort)  -CW      Bed Mob, Sit to Supine, [place] verbal cues required;nonverbal cues required (demo/gesture);moderate assist (50% patient effort)  -CW      Recorded by [JAROCHO] Isidro Hopkins      Transfer Assessment/Treatment    Transfers, Sit-Stand [place] maximum assist (25% patient effort);2 person assist required;nonverbal cues required (demo/gesture);verbal cues required  -CW      Transfers, Stand-Sit [place] nonverbal cues required (demo/gesture);verbal cues required;maximum assist (25% patient effort);2 person assist required  -CW      Transfers, Sit-Stand-Sit, Assist Device rolling walker  -CW      Recorded by [JAROCHO] Isidro Hopkins      Therapy Exercises    Bilateral Lower Extremities 10 reps;AAROM:;LAQ;hip flexion  -CW      Bilateral Upper Extremity AAROM:;10 reps;elbow flexion/extension;shoulder abduction/adduction  -CW      Recorded by [JAROCHO] Isidro Hopkins      Positioning and Restraints    Pre-Treatment Position in bed  -CW      Post Treatment Position bed  -CW      In Bed notified nsg;supine;call light within reach;encouraged to call for assist;exit alarm on  -CW      Recorded by [JAROCHO] Isidro Hopkins        User Key  (r) = Recorded By, (t) = Taken By, (c) = Cosigned By    Initials Name Effective Dates    CW Isidro Hopkins 12/13/16 -                 IP PT  Goals       03/12/17 1510          Bed Mobility PT LTG    Bed Mobility PT LTG, Date Established 03/12/17  -JR      Bed Mobility PT LTG, Time to Achieve 1 wk  -JR      Bed Mobility PT LTG, Activity Type all bed mobility  -JR      Bed Mobility PT LTG, [place] Level minimum assist (75% patient effort)  -JR      Transfer Training PT LTG    Transfer Training PT LTG, Date Established 03/12/17  -JR      Transfer Training PT LTG, Time to Achieve 1 wk  -JR      Transfer Training PT LTG, Activity Type all transfers  -JR      Transfer Training PT LTG, [place] Level minimum assist (75% patient effort)  -JR      Transfer Training PT LTG, Assist Device walker, rolling  -JR      Gait Training PT LTG    Gait Training Goal PT LTG, Date Established 03/12/17  -JR      Gait Training Goal PT LTG, Time to Achieve 1 wk  -JR      Gait Training Goal PT LTG, [place] Level minimum assist (75% patient effort)  -JR      Gait Training Goal PT LTG, Assist Device walker, rolling  -JR      Gait Training Goal PT LTG, Distance to Achieve 25  -JR        User Key  (r) = Recorded By, (t) = Taken By, (c) = Cosigned By    Initials Name Provider Type    JR Mau Marie, PT Physical Therapist          Physical Therapy Education     Title: PT OT SLP Therapies (Active)     Topic: Physical Therapy (Active)     Point: Mobility training (Active)    Learning Progress Summary    Learner Readiness Method Response Comment Documented by Status   Patient Acceptance E,D NR,NL   03/13/17 1104 Active    Nonacceptance E,D NR   03/12/17 1509 Active               Point: Home exercise program (Active)    Learning Progress Summary    Learner Readiness Method Response Comment Documented by Status   Patient Acceptance E,D NR,NL   03/13/17 1104 Active    Nonacceptance E,D NR   03/12/17 1509 Active               Point: Body mechanics (Active)    Learning Progress Summary    Learner Readiness Method Response Comment Documented by Status   Patient  Acceptance E,D NR,NL   03/13/17 1104 Active    Nonacceptance E,D NR   03/12/17 1509 Active               Point: Precautions (Active)    Learning Progress Summary    Learner Readiness Method Response Comment Documented by Status   Patient Acceptance E,D NR,NL   03/13/17 1104 Active    Nonacceptance E,D NR   03/12/17 1509 Active                      User Key     Initials Effective Dates Name Provider Type [facility] 01/07/16 -  Mau Marie, PT Physical Therapist PT     12/13/16 -  Isidro Hopkins Physical Therapy Assistant PT                    PT Recommendation and Plan  Anticipated Discharge Disposition: extended care facility  Planned Therapy Interventions: bed mobility training, gait training, balance training, stretching, strengthening  PT Frequency: daily, per priority policy  Plan of Care Review  Plan Of Care Reviewed With: patient  Progress: progress toward functional goals is gradual  Outcome Summary/Follow up Plan: Pt is making slow progress due to cognition          Outcome Measures       03/13/17 1100 03/12/17 1511       How much help from another person do you currently need...    Turning from your back to your side while in flat bed without using bedrails? 2  -CW 2  -JR     Moving from lying on back to sitting on the side of a flat bed without bedrails? 2  -CW 2  -JR     Moving to and from a bed to a chair (including a wheelchair)? 1  -CW 1  -JR     Standing up from a chair using your arms (e.g., wheelchair, bedside chair)? 2  -CW 2  -JR     Climbing 3-5 steps with a railing? 1  -CW 1  -JR     To walk in hospital room? 1  -CW 1  -JR     AM-PAC 6 Clicks Score 9  -CW 9  -JR     Functional Assessment    Outcome Measure Options AM-PAC 6 Clicks Basic Mobility (PT)  -CW AM-PAC 6 Clicks Basic Mobility (PT)  -JR       User Key  (r) = Recorded By, (t) = Taken By, (c) = Cosigned By    Initials Name Provider Type     Mau Marie, PT Physical Therapist     Isidro Hopkins Physical  Therapy Assistant           Time Calculation:         PT Charges       03/13/17 1106          Time Calculation    Start Time 1051  -CW      Stop Time 1106  -CW      Time Calculation (min) 15 min  -CW      PT Received On 03/13/17  -CW      PT - Next Appointment 03/14/17  -CW        User Key  (r) = Recorded By, (t) = Taken By, (c) = Cosigned By    Initials Name Provider Type    CW Isidro Hopkins Physical Therapy Assistant          Therapy Charges for Today     Code Description Service Date Service Provider Modifiers Qty    12640147522 HC PT THER PROC EA 15 MIN 3/13/2017 Isidro Hopkins GP 1          PT G-Codes  Outcome Measure Options: AM-PAC 6 Clicks Basic Mobility (PT)    Isidro Hopkins  3/13/2017

## 2017-03-14 PROBLEM — G93.41 METABOLIC ENCEPHALOPATHY: Status: RESOLVED | Noted: 2017-01-01 | Resolved: 2017-01-01

## 2017-03-14 PROBLEM — N18.30 CKD (CHRONIC KIDNEY DISEASE) STAGE 3, GFR 30-59 ML/MIN (HCC): Status: ACTIVE | Noted: 2017-01-01

## 2017-03-14 NOTE — PROGRESS NOTES
Acute Care - Speech Language Pathology   Swallow Re-Evaluation [facility]     Patient Name: Carley Madden  : 1933  MRN: [id number]  Today's Date: 3/14/2017  Onset of Illness/Injury or Date of Surgery Date: 17            Admit Date: 3/11/2017    SPEECH-LANGUAGE PATHOLOGY EVALUATION - SWALLOW  Subjective: The patient was seen on this date for a Clinical Swallow evaluation.  Patient was alert and cooperative as able.  Objective: Textures given included nectar thick liquid and puree consistency.  Assessment: Per RN, CNA feeding pt has had to manually remove pocketed food from patient's cheeks, and she is not swallowing. No s/s of aspiration w/ NTL or pureed, and no pocketing observed with pureed.  SLP Findings:  Patient presents with moderate oropharyngeal dysphagia, without esophageal component.   Recommendations: Diet Textures: nectar thick liquid, puree consistency food.  Medications should be taken crushed with puree. May have ice between meals after oral care, under staff or family supervision and with the recommended strategies for safe swallowing.   Recommended Strategies: 1:1 assist with cues to swallow prn, Upright for PO and small bites and sips. Oral care before breakfast, after all meals and PRN.  Other Recommended Evaluations:     Dysphagia therapy is not recommended. Rationale: Diet Tolerance only. Not a tx candidate.    Visit Dx:     ICD-10-CM ICD-9-CM   1. Urinary tract infection, site unspecified N39.0    2. Altered mental status, unspecified altered mental status type R41.82 780.97   3. Sepsis, due to unspecified organism A41.9 038.9     995.91   4. Acute renal failure, unspecified acute renal failure type N17.9 584.9   5. Dementia without behavioral disturbance, unspecified dementia type F03.90 294.20   6. Decreased ambulation status R68.89 780.99     Patient Active Problem List   Diagnosis   • Urinary tract infection   • SUSIE (acute kidney injury)   • Benign essential hypertension    • Bradycardia   • Cardiomyopathy   • Stenosis of carotid artery   • Diastolic dysfunction   • Type 2 diabetes mellitus   • Hyperlipidemia   • Mitral valve insufficiency   • Normocytic anemia   • Open wound of thigh   • Renal artery stenosis   • Dementia   • CKD (chronic kidney disease) stage 3, GFR 30-59 ml/min     Past Medical History   Diagnosis Date   • Bradycardia 1/12/2017   • Cardiomyopathy 12/13/2013   • COPD (chronic obstructive pulmonary disease)    • Dementia 3/11/2017   • Diabetes mellitus    • Diastolic dysfunction 12/13/2013   • Hyperlipidemia 12/13/2013   • Hypertension    • Mitral valve insufficiency 12/13/2013   • Normocytic anemia 1/12/2017   • Open wound of thigh 1/12/2017   • Renal artery stenosis 12/13/2013   • Stenosis of carotid artery 12/13/2013   • Stroke    • TIA (transient ischemic attack)    • Type 2 diabetes mellitus 12/13/2013     Past Surgical History   Procedure Laterality Date   • Back surgery            SWALLOW EVALUATION (last 72 hours)      Swallow Evaluation       03/14/17 1600 03/13/17 1207             Rehab Evaluation    Document Type re-evaluation  -CP evaluation  -NB       Symptoms Noted During/After Treatment  none  -NB       General Information    Patient Profile Review yes  -CP yes  -NB       Current Diet Limitations mechanical soft;nectar thick liquids  -CP thin liquids;puree  -NB       Precautions/Limitations, Vision  WFL with corrective lenses  -NB       Precautions/Limitations, Hearing  WFL  -NB       Prior Level of Function- Communication  limited to basic wants/needs  -NB       Prior Level of Function- Swallowing  no diet consistency restrictions  -NB       Plans/Goals Discussed With  patient;agreed upon  -NB       Barriers to Rehab  cognitive status  -NB       Clinical Impression    Patient's Goals For Discharge  patient did not state  -NB       SLP Swallowing Diagnosis moderate dysphagia;oral dysfunction;mild dysphagia;pharyngeal dysfunction  -CP mild  dysphagia;oral dysfunction;pharyngeal dysfunction  -NB       Rehab Potential/Prognosis, Swallowing good, to achieve stated therapy goals  -CP good, to achieve stated therapy goals  -NB       Criteria for Skilled Therapeutic Interventions Met skilled criteria for dysphagia intervention met  -CP skilled criteria for dysphagia intervention met  -NB       FCM, Swallowing 4-->Level 4  -CP        Therapy Frequency PRN  -CP PRN  -NB       Predicted Duration Therapy Interv (days) until discharge  -CP until discharge  -NB       Expected Duration Therapy Session (min) 15-30 minutes  -CP 15-30 minutes  -NB       SLP Diet Recommendation II - pureed;nectar/syrup-thick liquids  -CP soft textures;ground;nectar/syrup-thick liquids  -NB       Recommended Diagnostics reassess via clinical swallow (non-instrumental exam)  -CP reassess via clinical swallow (non-instrumental exam)  -NB       Recommended Feeding/Eating Techniques small sips/bites  -CP small sips/bites  -NB       SLP Rec. for Method of Medication Administration meds crushed in pudding/applesauce  -CP meds whole in pudding/applesauce  -NB       Monitor For Signs Of Aspiration cough;gurgly voice;throat clearing  -CP cough;gurgly voice;throat clearing  -NB       Anticipated Discharge Disposition skilled nursing facility  - skilled nursing facility  -NB       Pain Assessment    Pain Assessment  No/denies pain  -NB       Cognitive Assessment/Intervention    Current Cognitive/Communication Assessment  impaired  -NB       Orientation Status  oriented to;person  -NB       Follows Commands/Answers Questions  50% of the time;able to follow single-step instructions  -NB       Oral Motor Structure and Function    Oral Motor Anatomy and Physiology  patient demonstrates anatomy that is WNL  -NB       Dentition Assessment  upper dentures/partial in place  -NB       Secretion Management  WNL/WFL  -NB       Mucosal Quality  moist, healthy  -NB       Velar Elevation  WNL (within normal  limits)  -NB       Volitional Swallow  no difficulties initiating volitional swallow  -NB       Volitional Cough  no difficulties initiating volitional cough  -NB       Oral Musculature General Assessment  WNL (within normal limits)  -NB         User Key  (r) = Recorded By, (t) = Taken By, (c) = Cosigned By    Initials Name Effective Dates    NB Elissa Courtney, MS [facility]-SLP 04/13/15 -     CP Micheline Dao MS CCC-SLP 04/13/15 -         EDUCATION  The patient has been educated in the following areas:   Modified Diet Instruction.    SLP Recommendation and Plan  SLP Swallowing Diagnosis: moderate dysphagia, oral dysfunction, mild dysphagia, pharyngeal dysfunction  SLP Diet Recommendation: II - pureed, nectar/syrup-thick liquids  Recommended Feeding/Eating Techniques: small sips/bites  SLP Rec. for Method of Medication Administration: meds crushed in pudding/applesauce  Monitor For Signs Of Aspiration: cough, gurgly voice, throat clearing  Recommended Diagnostics: reassess via clinical swallow (non-instrumental exam)  Criteria for Skilled Therapeutic Interventions Met: skilled criteria for dysphagia intervention met  Anticipated Discharge Disposition: skilled nursing facility  Rehab Potential/Prognosis, Swallowing: good, to achieve stated therapy goals  Therapy Frequency: PRN             Plan of Care Review  Plan Of Care Reviewed With: patient          IP SLP Goals       03/14/17 1603 03/13/17 1205       Safely Consume Diet    Safely Consume Diet- SLP, Date Established  03/13/17  -NB     Safely Consume Diet- SLP, Time to Achieve  by discharge  -NB     Safely Consume Diet- SLP, Additional Goal Pureed/NTL  -CP mech soft w/ nectar thick  -NB     Safely Consume Diet- SLP, Date Goal Reviewed  03/13/17  -NB     Safely Consume Diet- SLP, Outcome goal ongoing  -CP        User Key  (r) = Recorded By, (t) = Taken By, (c) = Cosigned By    Initials Name Provider Type    NB Elissa Eaglerego, MS CCC-SLP Speech and Language Pathologist     LOLIS Dao MS CCC-SLP Speech and Language Pathologist             SLP Outcome Measures (last 72 hours)      SLP Outcome Measures       03/14/17 1600 03/13/17 1206       SLP Outcome Measures    Outcome Measure Used? Adult NOMS  -CP Adult NOMS  -NB     FCM Scores    FCM Chosen Swallowing  -CP Swallowing  -NB     Swallowing FCM Score 4  -CP 4  -NB       User Key  (r) = Recorded By, (t) = Taken By, (c) = Cosigned By    Initials Name Effective Dates    NB Elissa Courtney MS CCC-SLP 04/13/15 -     MS CRISTOPHER Tamez 04/13/15 -            Time Calculation:         Time Calculation- SLP       03/14/17 1604          Time Calculation- SLP    SLP Received On 03/14/17  -CP        User Key  (r) = Recorded By, (t) = Taken By, (c) = Cosigned By    Initials Name Provider Type    CP Micheline Dao MS CCC-SLP Speech and Language Pathologist          Therapy Charges for Today     Code Description Service Date Service Provider Modifiers Qty    26903186828 HC ST TREATMENT SWALLOW 4 3/14/2017 Micheline Dao MS CCC-SLP GN 1               MS CRISTOPHER Joshi  3/14/2017

## 2017-03-14 NOTE — PLAN OF CARE
Problem: Patient Care Overview (Adult)  Goal: Plan of Care Review    03/14/17 1603   Coping/Psychosocial Response Interventions   Plan Of Care Reviewed With patient         Problem: Inpatient SLP  Goal: Dysphagia- Patient will safely consume diet as per recommendation with no signs/symptoms of aspiration    03/13/17 1205 03/14/17 1603   Safely Consume Diet   Safely Consume Diet- SLP, Date Established 03/13/17 --    Safely Consume Diet- SLP, Time to Achieve by discharge --    Safely Consume Diet- SLP, Additional Goal --  Pureed/NTL   Safely Consume Diet- SLP, Outcome --  goal ongoing

## 2017-03-14 NOTE — PROGRESS NOTES
"    DAILY PROGRESS NOTE  [facility]    Patient Identification:  Name: Carley Madden  Age: 83 y.o.  Sex: female  :  1933  MRN: [id number]         Primary Care Physician: Heath Velasco DO    Subjective:  Interval History: no new issues. No cp/sob/n/v/d - daughter at bs.     Objective:    Scheduled Meds:  aspirin 81 mg Oral Daily   atorvastatin 40 mg Oral Daily   carvedilol 12.5 mg Oral BID With Meals   castor oil-balsam peru 1 application Topical Q12H   cefTRIAXone 2 g Intramuscular Q24H   clopidogrel 75 mg Oral Daily   collagenase 1 application Topical Daily   cyanocobalamin 1,000 mcg Intramuscular Daily   dakin's 1 application Topical Q24H   enoxaparin 30 mg Subcutaneous Daily   famotidine 20 mg Oral BID   insulin aspart 0-7 Units Subcutaneous 4x Daily AC & at Bedtime   isosorbide mononitrate 60 mg Oral Daily   levothyroxine 88 mcg Oral Q AM   lidocaine 4.2 mL Intradermal Q24H   sodium chloride 1,000 mL Intravenous Once     Continuous Infusions:  sodium chloride 75 mL/hr Last Rate: 75 mL/hr (17 2306)       Vital signs in last 24 hours:  Temp:  [97.8 °F (36.6 °C)-98 °F (36.7 °C)] 97.8 °F (36.6 °C)  Heart Rate:  [53-77] 71  Resp:  [18] 18  BP: (129-170)/(44-93) 136/93    Intake/Output:  No intake or output data in the 24 hours ending 17 1138    Exam:  Visit Vitals   • /93   • Pulse 71   • Temp 97.8 °F (36.6 °C) (Oral)   • Resp 18   • Ht 62.01\" (157.5 cm)   • Wt 134 lb 0.6 oz (60.8 kg)   • SpO2 100%   • BMI 24.51 kg/m2       General Appearance:    Alert, cooperative, no distress, pleasant                          Head:    Normocephalic, without obvious abnormality, atraumatic                         Throat:   Pocketing meds w/ applesauce                         Lungs:    Clear to auscultation bilaterally, respirations unlabored                 Chest Wall:    No tenderness or deformity                          Heart:    Regular rate and rhythm, S1 and S2 normal             "      Abdomen:     Soft, non-tender, bowel sounds active                 Extremities:   Chronic right thigh wound, no cyanosis or edema                        Pulses:   Pulses palpable in all extremities                                 Data Review:  Labs in chart were reviewed.    Assessment:  Active Hospital Problems (** Indicates Principal Problem)    Diagnosis Date Noted   • **SUSIE (acute kidney injury) [N17.9] 03/11/2017   • Urinary tract infection [N39.0] 03/11/2017   • Metabolic encephalopathy [G93.41] 03/11/2017   • Dementia [F03.90] 03/11/2017   • Open wound of thigh [S71.109A] 01/12/2017   • Type 2 diabetes mellitus [E11.9] 12/13/2013      Resolved Hospital Problems    Diagnosis Date Noted Date Resolved   No resolved problems to display.       Plan:  ARF - CKD3 stable - SLIVF    Elevated troponins - Cards consulted    UTI - C/S pending - Lucio dosing Rocephin - add Florastor to prevent GI aversion     Open wound to right thigh - PRS consulted    DM2 - ssi - Glipizde held     Dementia - metabolic encephalopathy resolving     B12 deficiency - Hgb stable/improving     Dispo - [facility] when ready in next day or 2    Ancelmo Montes MD  3/14/2017  11:38 AM

## 2017-03-14 NOTE — PLAN OF CARE
Problem: Patient Care Overview (Adult)  Goal: Plan of Care Review  Outcome: Ongoing (interventions implemented as appropriate)    03/14/17 1341   Coping/Psychosocial Response Interventions   Plan Of Care Reviewed With patient;family   Patient Care Overview   Progress improving   Outcome Evaluation   Outcome Summary/Follow up Plan able to odilon bed to chair today

## 2017-03-14 NOTE — PROGRESS NOTES
[facility]     LOS: 2 days     Subjective:  Nonverbal for me    Confused and not reliable historian    Objective:    Vital Signs:  Temp:  [97.3 °F (36.3 °C)-98.6 °F (37 °C)] 97.8 °F (36.6 °C)  Heart Rate:  [54-77] 70  Resp:  [18] 18  BP: (129-174)/(44-60) 155/58    General Appearance: no acute distress, appears comfortable  Heart: regular rate and rhythm  Lungs: clear to auscultation bilaterally, respirations unlabored, good air entry  Abdomen: soft, non-tender, no guarding, no rebound, non-distended  Extremities: no edema, no cyanosis  Neurology: nonverbal, CN grossly normal, looks at me  Psychiatric: normal mood and affect    Results Review:    GLUCOSE   Date Value Ref Range Status   03/12/2017 125 (H) 65 - 99 mg/dL Final   03/11/2017 285 (H) 65 - 99 mg/dL Final       Results from last 7 days  Lab Units 03/12/17  1000   WBC 10*3/mm3 10.55   HEMOGLOBIN g/dL 8.5*   HEMATOCRIT % 27.8*   PLATELETS 10*3/mm3 370       Results from last 7 days  Lab Units 03/12/17  0849 03/11/17  1539   SODIUM mmol/L 138 136   POTASSIUM mmol/L 4.6 5.3*   CHLORIDE mmol/L 105 96*   TOTAL CO2 mmol/L 18.3* 19.2*   BUN mg/dL 35* 43*   CREATININE mg/dL 1.21* 2.14*   CALCIUM mg/dL 8.4* 9.3   BILIRUBIN mg/dL  --  0.7   ALK PHOS U/L  --  89   ALT (SGPT) U/L  --  27   AST (SGOT) U/L  --  29   GLUCOSE mg/dL 125* 285*       Results from last 7 days  Lab Units 03/11/17  1647   INR  1.24*             Results from last 7 days  Lab Units 03/12/17  0849 03/11/17  1539   CK TOTAL U/L  --  239*   TROPONIN T ng/mL 0.039* 0.049*                        I have reviewed daily medications and changes in CPOE    Scheduled meds    aspirin 81 mg Oral Daily   atorvastatin 40 mg Oral Daily   carvedilol 12.5 mg Oral BID With Meals   castor oil-balsam peru 1 application Topical Q12H   cefTRIAXone 2 g Intramuscular Q24H   clopidogrel 75 mg Oral Daily   collagenase 1 application Topical Daily   dakin's 1 application Topical Q24H   enoxaparin  30 mg Subcutaneous Daily   famotidine 20 mg Oral BID   insulin aspart 0-7 Units Subcutaneous 4x Daily AC & at Bedtime   isosorbide mononitrate 60 mg Oral Daily   levothyroxine 88 mcg Oral Q AM   sodium chloride 1,000 mL Intravenous Once   vitamin B-12 1,000 mcg Oral Daily         sodium chloride 125 mL/hr Last Rate: 125 mL/hr (03/12/17 1225)     PRN meds  •  acetaminophen  •  dextrose  •  dextrose  •  glucagon (human recombinant)  •  HYDROcodone-acetaminophen  •  HYDROcodone-acetaminophen  •  nitroglycerin  •  ondansetron **OR** ondansetron ODT **OR** ondansetron  •  sodium chloride    Assessment:        SUSIE (acute kidney injury)- improved, check the creatinine, decrease the fluids      Urinary tract infection- urine culture      Type 2 diabetes mellitus- bs      Open wound of thigh      Metabolic encephalopathy      Dementia    Elevated troponins- family wanting to aggressive measures so will ask cardiology to see, check troponin in the am, echo at [facility] January 2017.    Possible wound infection- Id is following    Syncope - seizure vs cardiac etiology of syncope    b12 deficiency-  b12 injection    hypothroidism - tsh is 8 so will get free t4 level in am    Anemia- iron studies, hemoccult stool    Plan:  Iv fluids    Rocephin    ssi    ST was unable to assess because of decreased LOC    ID consulted    D/w daughter    Cardiology and neurology to see, family wanting aggressive measures    Plastic surgery consulted by ID        >35 minutes spent, > 1/2 time spent counseling and coordination of care on syncope     Amos Linares MD  03/13/17  8:29 PM

## 2017-03-14 NOTE — CONSULTS
Patient Identification:  NAME:  Carley Madden  Age:  83 y.o.   Sex:  female   :  1933   MRN:  [id number]       Chief complaint: Does not have one, reason for consult confusion    History of present illness:  Patient is an 83-year-old right-handed black female with history of dementia diabetes hypertension cardiomyopathy who is been confused quality is lethargy and confusion no associated symptoms did not apparently hallucinating but possibly intermittently.  The patient denies that at this time.  Quality is lethargy and confusion duration is been at least several days if not longer context the patient with known history of dementia location is not pertinent      Past medical history:  Past Medical History   Diagnosis Date   • Bradycardia 2017   • Cardiomyopathy 2013   • COPD (chronic obstructive pulmonary disease)    • Dementia 3/11/2017   • Diabetes mellitus    • Diastolic dysfunction 2013   • Hyperlipidemia 2013   • Hypertension    • Mitral valve insufficiency 2013   • Normocytic anemia 2017   • Open wound of thigh 2017   • Renal artery stenosis 2013   • Stenosis of carotid artery 2013   • Stroke    • TIA (transient ischemic attack)    • Type 2 diabetes mellitus 2013       Past surgical history:  Past Surgical History   Procedure Laterality Date   • Back surgery         Allergies:  Review of patient's allergies indicates no known allergies.    Home medications:  Prescriptions Prior to Admission   Medication Sig Dispense Refill Last Dose   • aspirin 81 MG chewable tablet Chew 81 mg Daily.      • atorvastatin (LIPITOR) 40 MG tablet Take 40 mg by mouth Daily.      • carvedilol (COREG) 12.5 MG tablet Take 12.5 mg by mouth 2 (Two) Times a Day With Meals.      • castor oil-balsam peru (VENELEX) ointment Apply 1 application topically 2 (Two) Times a Day. To heel blisters      • clopidogrel (PLAVIX) 75 MG tablet Take 75 mg by mouth Daily.      •  collagenase 250 UNIT/GM ointment Apply 1 application topically Daily. ro wound on right thigh      • dakin's (HYSEPT) 0.25 % solution topical solution Apply 1 application topically Daily. To pack right thigh wound daily and cover with optifoam      • furosemide (LASIX) 20 MG tablet Take 20 mg by mouth Daily.      • glipiZIDE (GLUCOTROL) 5 MG tablet Take 2.5 mg by mouth Daily.      • HYDROcodone-acetaminophen (NORCO) 5-325 MG per tablet Take 1 tablet by mouth Every 6 (Six) Hours As Needed for moderate pain (4-6).      • isosorbide mononitrate (IMDUR) 60 MG 24 hr tablet Take 60 mg by mouth Daily.      • levothyroxine (SYNTHROID, LEVOTHROID) 88 MCG tablet Take 88 mcg by mouth Every Morning.      • lisinopril (PRINIVIL,ZESTRIL) 40 MG tablet Take 40 mg by mouth Daily.      • naproxen (NAPROSYN) 500 MG tablet Take 500 mg by mouth 2 (Two) Times a Day As Needed for Mild Pain (1-3).      • nitroglycerin (NITROSTAT) 0.4 MG SL tablet Place 0.4 mg under the tongue Every 5 (Five) Minutes As Needed for chest pain. Take no more than 3 doses in 15 minutes.      • raNITIdine (ZANTAC) 150 MG tablet Take 150 mg by mouth 2 (Two) Times a Day.           Hospital medications:    aspirin 81 mg Oral Daily   atorvastatin 40 mg Oral Daily   carvedilol 12.5 mg Oral BID With Meals   castor oil-balsam peru 1 application Topical Q12H   cefTRIAXone 2 g Intramuscular Q24H   clopidogrel 75 mg Oral Daily   collagenase 1 application Topical Daily   cyanocobalamin 1,000 mcg Intramuscular Daily   dakin's 1 application Topical Q24H   enoxaparin 30 mg Subcutaneous Daily   famotidine 20 mg Oral BID   insulin aspart 0-7 Units Subcutaneous 4x Daily AC & at Bedtime   isosorbide mononitrate 60 mg Oral Daily   levothyroxine 88 mcg Oral Q AM   lidocaine 4.2 mL Intradermal Q24H   saccharomyces boulardii 250 mg Oral BID   sodium chloride 1,000 mL Intravenous Once        •  acetaminophen  •  dextrose  •  dextrose  •  glucagon (human recombinant)  •   HYDROcodone-acetaminophen  •  nitroglycerin  •  ondansetron **OR** ondansetron ODT **OR** ondansetron  •  sodium chloride    Family history:  Family History   Problem Relation Age of Onset   • Hypertension Other        Social history:  Social History   Substance Use Topics   • Smoking status: Former Smoker     Packs/day: 1.00     Years: 15.00     Types: Cigarettes   • Smokeless tobacco: None   • Alcohol use No       Review of systems:    She could not answer really any of this.  Her daughter was in the room and was able to answer a few questions stating that she had not had headaches and she has been lethargic and confused but may be a bit better today nothing else is really available other than the fact it is known that this patient has some history of dementia    Objective:  Vitals Ranges:   Temp:  [97.8 °F (36.6 °C)-98 °F (36.7 °C)] 97.8 °F (36.6 °C)  Heart Rate:  [53-77] 71  Resp:  [18] 18  BP: (129-170)/(44-93) 136/93      Physical Exam:  Awake little lethargic very hard of hearing fund of knowledge poor attention span and concentration poor recent remote memory poor language function dysarthria without a aphasia well-developed well-nourished in no distress of visual acuity she was able to count fingers pupils to constricting to 1 eyes are conjugate I could not check visual fields or discs of face is symmetrical tongue is midline no other cranial nerves which she cooperate with motor very hard to tell poor cooperation rigidity bradykinesia overall strength 4+ out of 5 times for her legs are pulled up in bed and she is not following any of my commands for any length of time making this a very difficult exam no fasciculations were noted.  Reflexes essentially absent throughout toes downgoing bilaterally sensation coordination station and gait she couldn't follow any of this heart regular without murmur neck is supple without bruits    Results review:   I reviewed the patient's new clinical results.    Data  review:  Lab Results (last 24 hours)     Procedure Component Value Units Date/Time    POC Glucose Fingerstick [[id number]]  (Abnormal) Collected:  03/13/17 1747    Specimen:  Blood Updated:  03/13/17 1749     Glucose 150 (H) mg/dL     Narrative:       Meter: [id number] : [id number] Harley Underwood    POC Glucose Fingerstick [[id number]]  (Abnormal) Collected:  03/13/17 2021    Specimen:  Blood Updated:  03/13/17 2024     Glucose 158 (H) mg/dL     Narrative:       Meter: [id number] : [id number] [facility]    Basic Metabolic Panel [[id number]]  (Abnormal) Collected:  03/13/17 2145    Specimen:  Blood Updated:  03/13/17 2239     Glucose 138 (H) mg/dL      BUN 20 mg/dL      Creatinine 1.00 mg/dL      Sodium 138 mmol/L      Potassium 4.2 mmol/L      Chloride 105 mmol/L      CO2 20.1 (L) mmol/L      Calcium 8.7 mg/dL      eGFR  African Amer 64 mL/min/1.73      BUN/Creatinine Ratio 20.0      Anion Gap 12.9 mmol/L     Narrative:       The MDRD GFR formula is only valid for adults with stable renal function between ages 18 and 70.    Scan Slide [[id number]]     Specimen:  Blood Updated:  03/14/17 0632    Iron Profile [[id number]]  (Abnormal) Collected:  03/14/17 0607    Specimen:  Blood Updated:  03/14/17 0656     Iron 16 (L) mcg/dL      Iron Saturation 15 (L) %      Transferrin 72 (L) mg/dL      TIBC 107 mcg/dL     Ferritin [[id number]]  (Abnormal) Collected:  03/14/17 0607    Specimen:  Blood Updated:  03/14/17 0701     Ferritin 353.70 (H) ng/mL     POC Glucose Fingerstick [[id number]]  (Normal) Collected:  03/14/17 0735    Specimen:  Blood Updated:  03/14/17 0737     Glucose 106 mg/dL     Narrative:       Meter: [id number] : [id number] Harley Underwood    Wound Culture [[id number]]  (Abnormal) Collected:  03/13/17 1423    Specimen:  Wound from Thigh, Right Updated:  03/14/17 0937     Wound Culture Light growth (2+) Proteus species (A)       Light growth (2+) Gram Positive Cocci (A)      Gram Stain Result Rare (1+) WBCs seen               Moderate (3+) Gram positive cocci in pairs and chains    CBC & Differential [[id number]] Collected:  03/14/17 0949    Specimen:  Blood Updated:  03/14/17 1008    Narrative:       The following orders were created for panel order CBC & Differential.  Procedure                               Abnormality         Status                     ---------                               -----------         ------                     Scan Slide[[id number]]                                                                   CBC Auto Differential[[id number]]         Abnormal            Final result                 Please view results for these tests on the individual orders.    CBC Auto Differential [[id number]]  (Abnormal) Collected:  03/14/17 0949    Specimen:  Blood Updated:  03/14/17 1008     WBC 8.89 10*3/mm3      RBC 2.94 (L) 10*6/mm3      Hemoglobin 8.9 (L) g/dL      Hematocrit 27.3 (L) %      MCV 92.9 fL      MCH 30.3 pg      MCHC 32.6 g/dL      RDW 15.9 (H) %      RDW-SD 53.7 fl      MPV 9.6 fL      Platelets 438 10*3/mm3      Neutrophil % 76.2 (H) %      Lymphocyte % 12.8 (L) %      Monocyte % 8.4 %      Eosinophil % 1.3 %      Basophil % 0.2 %      Immature Grans % 1.1 (H) %      Neutrophils, Absolute 6.76 10*3/mm3      Lymphocytes, Absolute 1.14 10*3/mm3      Monocytes, Absolute 0.75 10*3/mm3      Eosinophils, Absolute 0.12 10*3/mm3      Basophils, Absolute 0.02 10*3/mm3      Immature Grans, Absolute 0.10 (H) 10*3/mm3     Basic Metabolic Panel [[id number]]  (Abnormal) Collected:  03/14/17 0949    Specimen:  Blood Updated:  03/14/17 1034     Glucose 124 (H) mg/dL      BUN 15 mg/dL      Creatinine 0.89 mg/dL      Sodium 138 mmol/L      Potassium 4.0 mmol/L      Chloride 103 mmol/L      CO2 21.8 (L) mmol/L      Calcium 8.6 mg/dL      eGFR  African Amer 73 mL/min/1.73      BUN/Creatinine Ratio 16.9      Anion Gap 13.2 mmol/L     Narrative:       The MDRD GFR formula is only valid for adults with stable renal function  between ages 18 and 70.    Troponin [[id number]]  (Abnormal) Collected:  03/14/17 0949    Specimen:  Blood Updated:  03/14/17 1101     Troponin T 0.045 (H) ng/mL     Narrative:       Troponin T Reference Ranges:  Less than 0.03 ng/mL:    Negative for AMI  0.03 to 0.09 ng/mL:      Indeterminant for AMI  Greater than 0.09 ng/mL: Positive for AMI    POC Glucose Fingerstick [[id number]]  (Normal) Collected:  03/14/17 1128    Specimen:  Blood Updated:  03/14/17 1134     Glucose 118 mg/dL     Narrative:       Meter: [id number] : [id number] Souza Yasmine           Imaging:  Imaging Results (last 24 hours)     ** No results found for the last 24 hours. **             Assessment and Plan:     Principal Problem:    SUSIE (acute kidney injury)  Active Problems:    Urinary tract infection    Type 2 diabetes mellitus    Open wound of thigh    Dementia    CKD (chronic kidney disease) stage 3, GFR 30-59 ml/min   this patient definitely has metabolic encephalopathy on top of vascular dementia and she is also hard of hearing.  He has been somewhat confused but now seems calm and I have performed an independent eyeball review the CAT scan of the head shows chronic changes only.  This is not going to be in acute stroke TIA or central nervous system infection.  I will not add any new medications at this time and reassess her tomorrow.  Thank you      Mau Ferrera MD  03/14/17  1:00 PM

## 2017-03-14 NOTE — PLAN OF CARE
Problem: Patient Care Overview (Adult)  Goal: Plan of Care Review  Outcome: Ongoing (interventions implemented as appropriate)    03/14/17 0504   Coping/Psychosocial Response Interventions   Plan Of Care Reviewed With patient   Patient Care Overview   Progress improving   Outcome Evaluation   Outcome Summary/Follow up Plan no s/s of acute distress noted. will continue to monitor pt.        Goal: Adult Individualization and Mutuality  Outcome: Ongoing (interventions implemented as appropriate)  Goal: Discharge Needs Assessment  Outcome: Ongoing (interventions implemented as appropriate)    Problem: Skin Integrity Impairment, Risk/Actual (Adult)  Goal: Skin Integrity/Wound Healing  Outcome: Ongoing (interventions implemented as appropriate)    Problem: Fall Risk (Adult)  Goal: Absence of Falls  Outcome: Ongoing (interventions implemented as appropriate)

## 2017-03-14 NOTE — PROGRESS NOTES
Acute Care - Physical Therapy Treatment Note  [facility]     Patient Name: Carley Madden  : 1933  MRN: [id number]  Today's Date: 3/14/2017  Onset of Illness/Injury or Date of Surgery Date: 17  Date of Referral to PT: 17  Referring Physician: LEIA YEH    Admit Date: 3/11/2017    Visit Dx:    ICD-10-CM ICD-9-CM   1. Urinary tract infection, site unspecified N39.0    2. Altered mental status, unspecified altered mental status type R41.82 780.97   3. Sepsis, due to unspecified organism A41.9 038.9     995.91   4. Acute renal failure, unspecified acute renal failure type N17.9 584.9   5. Dementia without behavioral disturbance, unspecified dementia type F03.90 294.20   6. Decreased ambulation status R68.89 780.99     Patient Active Problem List   Diagnosis   • Urinary tract infection   • SUSIE (acute kidney injury)   • Benign essential hypertension   • Bradycardia   • Cardiomyopathy   • Stenosis of carotid artery   • Diastolic dysfunction   • Type 2 diabetes mellitus   • Hyperlipidemia   • Mitral valve insufficiency   • Normocytic anemia   • Open wound of thigh   • Renal artery stenosis   • Dementia   • CKD (chronic kidney disease) stage 3, GFR 30-59 ml/min               Adult Rehabilitation Note       17 1045 17 1100       Rehab Assessment/Intervention    Discipline physical therapy assistant  - physical therapy assistant  -     Document Type therapy note (daily note)  - therapy note (daily note)  -CW     Subjective Information agree to therapy;complains of;weakness;fatigue;dizziness  - agree to therapy;decreased LOC  -CW     Patient Effort, Rehab Treatment  poor  -CW     Precautions/Limitations fall precautions  - fall precautions  -CW     Recorded by [JM] Isi Parkinson PTA [CW] Isidro Hopkins     Pain Assessment    Pain Assessment FLACC  - Kauffman-Albert FACES  -     Kauffman-Baker FACES Pain Rating  6  -CW     Pain Score 5  -      Pain Type Acute pain  -  Surgical pain  -CW     Pain Location --   rt inner thigh  - Leg  -CW     Pain Orientation  Right;Proximal  -CW     Pain Intervention(s) Repositioned;Ambulation/increased activity  - Medication (See MAR);Repositioned  -CW     Recorded by [JM] Isi Parkinson PTA [CW] Isidro Hopkins     Cognitive Assessment/Intervention    Current Cognitive/Communication Assessment  impaired  -CW     Orientation Status  oriented to;situation  -CW     Follows Commands/Answers Questions  able to follow single-step instructions;needs cueing;needs increased time;needs repetition  -CW     Personal Safety  moderate impairment;decreased awareness, need for safety;decreased insight to deficits  -CW     Recorded by  [CW] Isidro Hopkins     Bed Mobility, Assessment/Treatment    Bed Mobility, Assistive Device draw sheet  - draw sheet;head of bed elevated  -     Bed Mobility, Scoot/Bridge, [place] moderate assist (50% patient effort);2 person assist required;verbal cues required;nonverbal cues required (demo/gesture)  -      Bed Mob, Supine to Sit, [place] minimum assist (75% patient effort);moderate assist (50% patient effort)  - verbal cues required;nonverbal cues required (demo/gesture);moderate assist (50% patient effort)  -     Bed Mob, Sit to Supine, [place]  verbal cues required;nonverbal cues required (demo/gesture);moderate assist (50% patient effort)  -CW     Recorded by [JM] Isi Parkinson PTA [CW] Isidro Hopkins     Transfer Assessment/Treatment    Transfers, Bed-Chair [place] moderate assist (50% patient effort);maximum assist (25% patient effort);2 person assist required;hand held assist  -      Transfers, Sit-Stand [place] moderate assist (50% patient effort);maximum assist (25% patient effort);2 person assist required  - maximum assist (25% patient effort);2 person assist required;nonverbal cues required (demo/gesture);verbal cues required  -     Transfers, Stand-Sit  [place] minimum assist (75% patient effort);2 person assist required;verbal cues required;nonverbal cues required (demo/gesture)  - nonverbal cues required (demo/gesture);verbal cues required;maximum assist (25% patient effort);2 person assist required  -CW     Transfers, Sit-Stand-Sit, Assist Device rolling walker  - rolling walker  -CW     Transfer, Impairments decreased flexibility;strength decreased;impaired balance  -      Recorded by [] Isi Parkinson PTA [CW] Isidro Hopkins     Gait Assessment/Treatment    Gait, [place] Level unable to perform   knee buckling w/standing attempts  -      Recorded by [JM] Isi Parkinson PTA      Therapy Exercises    Bilateral Lower Extremities AROM:;5 reps;ankle pumps/circles;LAQ   could not fully ext knees for LAQ  - 10 reps;AAROM:;LAQ;hip flexion  -     Bilateral Upper Extremity  AAROM:;10 reps;elbow flexion/extension;shoulder abduction/adduction  -CW     Recorded by [] Isi Parkinson PTA [CW] Isidro Hopkins     Positioning and Restraints    Pre-Treatment Position in bed  - in bed  -CW     Post Treatment Position chair   brief sit in straight back chair while linens changed on bed  - bed  -CW     In Bed  notified nsg;supine;call light within reach;encouraged to call for assist;exit alarm on  -CW     In Chair sitting;call light within reach;encouraged to call for assist;with family/caregiver;with nsg   nsg aware no alarm for chair--family staying   -      Recorded by [KALPANA] Isi Parkinson PTA [CW] Isidro Hopkins       User Key  (r) = Recorded By, (t) = Taken By, (c) = Cosigned By    Initials Name Effective Dates     Isi Parkinson PTA 02/18/16 -     CW Isidro Hopkins 12/13/16 -                 IP PT Goals       03/12/17 1510          Bed Mobility PT LTG    Bed Mobility PT LTG, Date Established 03/12/17  -JR      Bed Mobility PT LTG, Time to Achieve 1 wk  -JR      Bed Mobility PT LTG, Activity Type all bed mobility  -JR       Bed Mobility PT LTG, [place] Level minimum assist (75% patient effort)  -JR      Transfer Training PT LTG    Transfer Training PT LTG, Date Established 03/12/17  -JR      Transfer Training PT LTG, Time to Achieve 1 wk  -JR      Transfer Training PT LTG, Activity Type all transfers  -JR      Transfer Training PT LTG, [place] Level minimum assist (75% patient effort)  -JR      Transfer Training PT LTG, Assist Device walker, rolling  -JR      Gait Training PT LTG    Gait Training Goal PT LTG, Date Established 03/12/17  -JR      Gait Training Goal PT LTG, Time to Achieve 1 wk  -JR      Gait Training Goal PT LTG, [place] Level minimum assist (75% patient effort)  -JR      Gait Training Goal PT LTG, Assist Device walker, rolling  -JR      Gait Training Goal PT LTG, Distance to Achieve 25  -JR        User Key  (r) = Recorded By, (t) = Taken By, (c) = Cosigned By    Initials Name Provider Type    JR Mau Marie, PT Physical Therapist          Physical Therapy Education     Title: PT OT SLP Therapies (Active)     Topic: Physical Therapy (Active)     Point: Mobility training (Active)    Learning Progress Summary    Learner Readiness Method Response Comment Documented by Status   Patient Acceptance E,D NR participated, but became more resistant at end of session  03/14/17 1339 Active    Acceptance E,D NR,NL   03/13/17 1104 Active    Nonacceptance E,D NR   03/12/17 1509 Active   Family Acceptance E,D NR participated, but became more resistant at end of session  03/14/17 1339 Active               Point: Home exercise program (Active)    Learning Progress Summary    Learner Readiness Method Response Comment Documented by Status   Patient Acceptance E,D NR participated, but became more resistant at end of session  03/14/17 1339 Active    Acceptance E,D NR,NL   03/13/17 1104 Active    Nonacceptance E,D NR   03/12/17 1509 Active   Family Acceptance E,D NR participated, but became more resistant  at end of session  03/14/17 1339 Active               Point: Body mechanics (Active)    Learning Progress Summary    Learner Readiness Method Response Comment Documented by Status   Patient Acceptance E,D NR participated, but became more resistant at end of session  03/14/17 1339 Active    Acceptance E,D NR,NL   03/13/17 1104 Active    Nonacceptance E,D NR   03/12/17 1509 Active   Family Acceptance E,D NR participated, but became more resistant at end of session  03/14/17 1339 Active               Point: Precautions (Active)    Learning Progress Summary    Learner Readiness Method Response Comment Documented by Status   Patient Acceptance E,D NR participated, but became more resistant at end of session  03/14/17 1339 Active    Acceptance E,D NR,[facility] 03/13/17 1104 Active    Nonacceptance E,D NR   03/12/17 1509 Active   Family Acceptance E,D NR participated, but became more resistant at end of session  03/14/17 1339 Active                      User Key     Initials Effective Dates Name Provider Type Discipline     02/18/16 -  Isi Parkinson, PTA Physical Therapy Assistant PT     01/07/16 -  Mau Marie, PT Physical Therapist PT     12/13/16 -  Isidro Hopkins Physical Therapy Assistant PT                    PT Recommendation and Plan  Anticipated Discharge Disposition: extended care facility  Planned Therapy Interventions: bed mobility training, gait training, balance training, stretching, strengthening  PT Frequency: daily, per priority policy  Plan of Care Review  Plan Of Care Reviewed With: patient, family  Progress: improving  Outcome Summary/Follow up Plan: able to odilon bed to chair today          Outcome Measures       03/14/17 1000 03/13/17 1100 03/12/17 1511    How much help from another person do you currently need...    Turning from your back to your side while in flat bed without using bedrails? 3  - 2  -CW 2  -JR    Moving from lying on back to sitting on the side of a flat  bed without bedrails? 2  -JM 2  -CW 2  -JR    Moving to and from a bed to a chair (including a wheelchair)? 2  -JM 1  -CW 1  -JR    Standing up from a chair using your arms (e.g., wheelchair, bedside chair)? 2  -JM 2  -CW 2  -JR    Climbing 3-5 steps with a railing? 1  -JM 1  -CW 1  -JR    To walk in hospital room? 1  - 1  -CW 1  -JR    AM-PAC 6 Clicks Score 11  -JM 9  -CW 9  -JR    Functional Assessment    Outcome Measure Options  AM-PAC 6 Clicks Basic Mobility (PT)  -CW AM-PAC 6 Clicks Basic Mobility (PT)  -JR      User Key  (r) = Recorded By, (t) = Taken By, (c) = Cosigned By    Initials Name Provider Type    KALPANA Parkinson PTA Physical Therapy Assistant    JR Mau Marie, PT Physical Therapist    JAROCHO Hopkins Physical Therapy Assistant           Time Calculation:         PT Charges       03/14/17 1115          Time Calculation    Start Time 1017  -      Stop Time 1039  -      Time Calculation (min) 22 min  -KALPANA      PT Received On 03/14/17  -KALPANA      PT - Next Appointment 03/15/17  -KALPANA        User Key  (r) = Recorded By, (t) = Taken By, (c) = Cosigned By    Initials Name Provider Type    KALPANA Parkinson PTA Physical Therapy Assistant          Therapy Charges for Today     Code Description Service Date Service Provider Modifiers Qty    01016996294 HC PT THER PROC EA 15 MIN 3/14/2017 Isi Parkinson PTA GP 1    67720079972 HC PT THER SUPP EA 15 MIN 3/14/2017 Isi Parkinson PTA GP 1          PT G-Codes  Outcome Measure Options: AM-PAC 6 Clicks Basic Mobility (PT)    Isi Parkinson PTA  3/14/2017

## 2017-03-14 NOTE — CONSULTS
Nutrition Services    Patient Name:  Carley Madden  YOB: 1933  MRN: [id number]  Admit Date:  3/11/2017        Calorie count is in progress today. No results from yesterday although intake recorded 50% at dinner last night, 0% this morning at breakfast.  Will follow with calorie count results tomorrow as data available.       Electronically signed by:  Megan Overton RD  03/14/17 11:32 AM

## 2017-03-14 NOTE — PROGRESS NOTES
"  Infectious Diseases Progress Note    Diane Valdes MD     [facility]  [place]: 3 days  Patient Identification:  Name: Carley Madden  Age: 83 y.o.  Sex: female  :  1933  MRN: [id number]         Primary Care Physician: Heath Velasco, DO            Subjective: Interactive and feels better does not know which hospital she is in.  Interval History: See consultation note   Objective:    Scheduled Meds:    aspirin 81 mg Oral Daily   atorvastatin 40 mg Oral Daily   carvedilol 12.5 mg Oral BID With Meals   castor oil-balsam peru 1 application Topical Q12H   cefTRIAXone 2 g Intramuscular Q24H   clopidogrel 75 mg Oral Daily   collagenase 1 application Topical Daily   cyanocobalamin 1,000 mcg Intramuscular Daily   dakin's 1 application Topical Q24H   enoxaparin 30 mg Subcutaneous Daily   famotidine 20 mg Oral BID   insulin aspart 0-7 Units Subcutaneous 4x Daily AC & at Bedtime   isosorbide mononitrate 60 mg Oral Daily   levothyroxine 88 mcg Oral Q AM   lidocaine 4.2 mL Intradermal Q24H   sodium chloride 1,000 mL Intravenous Once     Continuous Infusions:    sodium chloride 75 mL/hr Last Rate: 75 mL/hr (17 2306)       Vital signs in last 24 hours:  Temp:  [97.8 °F (36.6 °C)-98 °F (36.7 °C)] 97.8 °F (36.6 °C)  Heart Rate:  [53-77] 71  Resp:  [18] 18  BP: (129-170)/(44-93) 136/93    Intake/Output:  No intake or output data in the 24 hours ending 17 0931    Exam:  Visit Vitals   • /93   • Pulse 71   • Temp 97.8 °F (36.6 °C) (Oral)   • Resp 18   • Ht 62.01\" (157.5 cm)   • Wt 134 lb 0.6 oz (60.8 kg)   • SpO2 100%   • BMI 24.51 kg/m2       General Appearance:    More awake and interactive in last night                          Head:    Normocephalic, without obvious abnormality, atraumatic                           Eyes:    PERRL, conjunctiva/corneas clear, EOM's intact, both eyes                         Throat:   Lips, tongue, gums normal; oral mucosa pink and moist                          "  Neck:   Supple, symmetrical, trachea midline, no JVD                         Lungs:    Clear to auscultation bilaterally, respirations unlabored                 Chest Wall:    No tenderness or deformity                          Heart:    Regular rate and rhythm, S1 and S2 normal, no murmur,no  Rub                                      or gallop                  Abdomen:     Soft, non-tender, bowel sounds active, no masses, no                                                        organomegaly                  Extremities:   Right thigh wound appears to be unchanged except that there is resolution of erythema around the necrotic edge that was noticed yesterday.                        Pulses:   Pulses palpable in all extremities                            Skin:   Skin is warm and dry,  no rashes or palpable lesions                        Data Review:    I reviewed the patient's new clinical results.    Results from last 7 days  Lab Units 03/12/17  1000 03/11/17  1539   WBC 10*3/mm3 10.55 12.76*   HEMOGLOBIN g/dL 8.5* 10.6*   PLATELETS 10*3/mm3 370 357       Results from last 7 days  Lab Units 03/13/17  2145 03/12/17  0849 03/11/17  1539   SODIUM mmol/L 138 138 136   POTASSIUM mmol/L 4.2 4.6 5.3*   CHLORIDE mmol/L 105 105 96*   TOTAL CO2 mmol/L 20.1* 18.3* 19.2*   BUN mg/dL 20 35* 43*   CREATININE mg/dL 1.00 1.21* 2.14*   CALCIUM mg/dL 8.7 8.4* 9.3   GLUCOSE mg/dL 138* 125* 285*    wound culture is pending.  Urine culture no growth so far  Assessment:  Principal Problem:    SUSIE (acute kidney injury)  Active Problems:    Urinary tract infection    Type 2 diabetes mellitus    Open wound of thigh    Metabolic encephalopathy    Dementia      Plan:  She seems to have improved.  If wound culture does not show any specific pathogen would recommend discontinuation of antibiotics as her UTI seems to have resolved metabolic encephalopathy has improved.  He currently discharges no antibiotics assuming that the cultures are  negative.  We'll follow up cultures later.  Need follow-up with her wound surgeon at [place] after she leaves the hospital in the meantime continue local care.   Diane Valdes MD  3/14/2017  9:31 AM    Much of this encounter note is an electronic transcription/translation of spoken language to printed text. The electronic translation of spoken language may permit erroneous, or at times, nonsensical words or phrases to be inadvertently transcribed; Although I have reviewed the note for such errors, some may still exist

## 2017-03-15 NOTE — PROGRESS NOTES
Continued Stay Note  [facility]     Patient Name: Carley Madden  MRN: [id number]  Today's Date: 3/15/2017    Admit Date: 3/11/2017          Discharge Plan       03/15/17 0859    Case Management/Social Work Plan    Plan [facility] vs. another SNF    Patient/Family In Agreement With Plan yes    Additional Comments S/W daughter, Yunier and she states that her mother's insurance will be cancelled on April 1st, and [facility] does not take the new insurance that she will be changing to.  Yunier has been s/w [facility] and is waiting to hear from her to see if it will be ok for mother to stay at [facility] until April 1st and then go straight Medicaid? S/w [facility] to inform of situation and will await return call.  Hoa, RN, CCP                Discharge Codes     None            Arminda Armendariz RN

## 2017-03-15 NOTE — PLAN OF CARE
Problem: Inpatient Physical Therapy  Goal: Bed Mobility Goal LTG- PT  Outcome: Unable to achieve outcome(s) by discharge Date Met:  03/15/17    03/15/17 1458   Bed Mobility PT LTG   Bed Mobility PT LTG, Outcome goal not met   Bed Mobility PT LTG, Reason Goal Not Met discharged from facility       Goal: Transfer Training Goal 1 LTG- PT  Outcome: Unable to achieve outcome(s) by discharge Date Met:  03/15/17    03/15/17 1458   Transfer Training PT LTG   Transfer Training PT LTG, Outcome goal not met   Transfer Training PT LTG, Reason Goal Not Met discharged from facility       Goal: Gait Training Goal LTG- PT  Outcome: Unable to achieve outcome(s) by discharge Date Met:  03/15/17    03/15/17 1458   Gait Training PT LTG   Gait Training Goal PT LTG, Outcome goal not met   Gait Training Goal PT LTG, Reason Goal Not Met discharged from facility

## 2017-03-15 NOTE — PROGRESS NOTES
Patient Identification:  NAME:  Carley Madden  Age:  83 y.o.   Sex:  female   :  1933   MRN:  [id number]       Chief complaint: Metabolic encephalopathy    History of present illness:  She does appear to be a bit more awake and alert and calm today.  I performed an independent eyeball review the CAT scan of the head and it is normal.  She denies any problems at this time      Past medical history:  Past Medical History   Diagnosis Date   • Bradycardia 2017   • Cardiomyopathy 2013   • COPD (chronic obstructive pulmonary disease)    • Dementia 3/11/2017   • Diabetes mellitus    • Diastolic dysfunction 2013   • Hyperlipidemia 2013   • Hypertension    • Mitral valve insufficiency 2013   • Normocytic anemia 2017   • Open wound of thigh 2017   • Renal artery stenosis 2013   • Stenosis of carotid artery 2013   • Stroke    • TIA (transient ischemic attack)    • Type 2 diabetes mellitus 2013       Allergies:  Review of patient's allergies indicates no known allergies.    Home medications:  Prescriptions Prior to Admission   Medication Sig Dispense Refill Last Dose   • aspirin 81 MG chewable tablet Chew 81 mg Daily.      • atorvastatin (LIPITOR) 40 MG tablet Take 40 mg by mouth Daily.      • carvedilol (COREG) 12.5 MG tablet Take 12.5 mg by mouth 2 (Two) Times a Day With Meals.      • castor oil-balsam peru (VENELEX) ointment Apply 1 application topically 2 (Two) Times a Day. To heel blisters      • clopidogrel (PLAVIX) 75 MG tablet Take 75 mg by mouth Daily.      • collagenase 250 UNIT/GM ointment Apply 1 application topically Daily. ro wound on right thigh      • dakin's (HYSEPT) 0.25 % solution topical solution Apply 1 application topically Daily. To pack right thigh wound daily and cover with optifoam      • furosemide (LASIX) 20 MG tablet Take 20 mg by mouth Daily.      • glipiZIDE (GLUCOTROL) 5 MG tablet Take 2.5 mg by mouth Daily.      •  HYDROcodone-acetaminophen (NORCO) 5-325 MG per tablet Take 1 tablet by mouth Every 6 (Six) Hours As Needed for moderate pain (4-6).      • isosorbide mononitrate (IMDUR) 60 MG 24 hr tablet Take 60 mg by mouth Daily.      • levothyroxine (SYNTHROID, LEVOTHROID) 88 MCG tablet Take 88 mcg by mouth Every Morning.      • lisinopril (PRINIVIL,ZESTRIL) 40 MG tablet Take 40 mg by mouth Daily.      • naproxen (NAPROSYN) 500 MG tablet Take 500 mg by mouth 2 (Two) Times a Day As Needed for Mild Pain (1-3).      • nitroglycerin (NITROSTAT) 0.4 MG SL tablet Place 0.4 mg under the tongue Every 5 (Five) Minutes As Needed for chest pain. Take no more than 3 doses in 15 minutes.      • raNITIdine (ZANTAC) 150 MG tablet Take 150 mg by mouth 2 (Two) Times a Day.           Hospital medications:    aspirin 81 mg Oral Daily   atorvastatin 40 mg Oral Daily   carvedilol 12.5 mg Oral BID With Meals   castor oil-balsam peru 1 application Topical Q12H   ceftriaxone 2 g Intravenous Q24H   clopidogrel 75 mg Oral Daily   collagenase 1 application Topical Daily   cyanocobalamin 1,000 mcg Intramuscular Daily   dakin's 1 application Topical Q24H   enoxaparin 30 mg Subcutaneous Daily   famotidine 20 mg Oral BID   insulin aspart 0-7 Units Subcutaneous 4x Daily AC & at Bedtime   isosorbide mononitrate 60 mg Oral Daily   levothyroxine 88 mcg Oral Q AM   saccharomyces boulardii 250 mg Oral BID   sodium chloride 1,000 mL Intravenous Once        •  acetaminophen  •  dextrose  •  dextrose  •  glucagon (human recombinant)  •  HYDROcodone-acetaminophen  •  nitroglycerin  •  ondansetron **OR** ondansetron ODT **OR** ondansetron  •  sodium chloride      Objective:  Vitals Ranges:   Temp:  [97.8 °F (36.6 °C)-99 °F (37.2 °C)] 98.6 °F (37 °C)  Heart Rate:  [55-79] 79  Resp:  [16-18] 18  BP: (138-162)/(55-87) 147/87      Physical Exam:  Awake alert not well oriented but very called normal cranial nerves II through VII tongue is midline decreased hearing motor 4  out of 5 in both upper extremities good  strength toes wiggle equal bilaterally    Results review:   I reviewed the patient's new clinical results.    Data review:  Lab Results (last 24 hours)     Procedure Component Value Units Date/Time    CBC & Differential [[id number]] Collected:  03/14/17 0949    Specimen:  Blood Updated:  03/14/17 1731    Narrative:       The following orders were created for panel order CBC & Differential.  Procedure                               Abnormality         Status                     ---------                               -----------         ------                     Scan Slide[[id number]]                                        Final result               CBC Auto Differential[[id number]]         Abnormal            Final result                 Please view results for these tests on the individual orders.    Scan Slide [[id number]] Collected:  03/14/17 0949    Specimen:  Blood Updated:  03/14/17 1731     Raine Cells Mod/2+      Schistocytes Slight/1+      Spherocytes Slight/1+      WBC Morphology Normal      Platelet Morphology Normal     POC Glucose Fingerstick [[id number]]  (Normal) Collected:  03/14/17 1824    Specimen:  Blood Updated:  03/14/17 1826     Glucose 117 mg/dL     Narrative:       Meter: [id number] : [id number] Homer Suarez    POC Glucose Fingerstick [[id number]]  (Normal) Collected:  03/14/17 2109    Specimen:  Blood Updated:  03/14/17 2111     Glucose 99 mg/dL     Narrative:       Meter: [id number] : [id number] Samuel Jenkins    POC Glucose Fingerstick [[id number]]  (Normal) Collected:  03/15/17 0728    Specimen:  Blood Updated:  03/15/17 0730     Glucose 111 mg/dL     Narrative:       Meter: [id number] : [id number] Meg Burden    POC Glucose Fingerstick [[id number]]  (Abnormal) Collected:  03/15/17 1136    Specimen:  Blood Updated:  03/15/17 1137     Glucose 188 (H) mg/dL     Narrative:       Meter: [id number] : [id number] Meg Burden            Imaging:  Imaging Results (last 24 hours)     ** No results found for the last 24 hours. **             Assessment and Plan:     Principal Problem:    SUSIE (acute kidney injury)  Active Problems:    Urinary tract infection    Type 2 diabetes mellitus    Open wound of thigh    Dementia    CKD (chronic kidney disease) stage 3, GFR 30-59 ml/min   metabolic encephalopathy is better.  The patient is awake alert and his best I can tell likely very close to her baseline mental status at this point I would not recommend further neurologic workup area and I will reconsult when necessary call thank you      Mau Ferrera MD  03/15/17  2:51 PM

## 2017-03-15 NOTE — PROGRESS NOTES
"  Infectious Diseases Progress Note    Diane Valdes MD     [facility]  [place]: 4 days  Patient Identification:  Name: Carley Madden  Age: 83 y.o.  Sex: female  :  1933  MRN: [id number]         Primary Care Physician: Heath Velasco, DO            Subjective: Denies any specific complaints being discharged today.  Interval History: See consultation note   Objective:    Scheduled Meds:    aspirin 81 mg Oral Daily   atorvastatin 40 mg Oral Daily   carvedilol 12.5 mg Oral BID With Meals   castor oil-balsam peru 1 application Topical Q12H   ceftriaxone 2 g Intravenous Q24H   clopidogrel 75 mg Oral Daily   collagenase 1 application Topical Daily   cyanocobalamin 1,000 mcg Intramuscular Daily   dakin's 1 application Topical Q24H   enoxaparin 30 mg Subcutaneous Daily   famotidine 20 mg Oral BID   insulin aspart 0-7 Units Subcutaneous 4x Daily AC & at Bedtime   isosorbide mononitrate 60 mg Oral Daily   levothyroxine 88 mcg Oral Q AM   saccharomyces boulardii 250 mg Oral BID   sodium chloride 1,000 mL Intravenous Once     Continuous Infusions:       Vital signs in last 24 hours:  Temp:  [97.8 °F (36.6 °C)-99 °F (37.2 °C)] 97.8 °F (36.6 °C)  Heart Rate:  [55-68] 55  Resp:  [16-18] 16  BP: (138-162)/(55-72) 159/55    Intake/Output:  No intake or output data in the 24 hours ending 03/15/17 1036    Exam:  Visit Vitals   • /55 (BP Location: Right arm, Patient Position: Lying)   • Pulse 55   • Temp 97.8 °F (36.6 °C) (Oral)   • Resp 16   • Ht 62.01\" (157.5 cm)   • Wt 134 lb 0.6 oz (60.8 kg)   • SpO2 98%   • BMI 24.51 kg/m2       General Appearance:    More awake and interactive in last night                          Head:    Normocephalic, without obvious abnormality, atraumatic                           Eyes:    PERRL, conjunctiva/corneas clear, EOM's intact, both eyes                         Throat:   Lips, tongue, gums normal; oral mucosa pink and moist                           Neck:   Supple, " symmetrical, trachea midline, no JVD                         Lungs:    Clear to auscultation bilaterally, respirations unlabored                 Chest Wall:    No tenderness or deformity                          Heart:    Regular rate and rhythm, S1 and S2 normal, no murmur,no  Rub                                      or gallop                  Abdomen:     Soft, non-tender, bowel sounds active, no masses, no                                                        organomegaly                  Extremities:   Right thigh wound appears to be unchanged except that there is resolution of erythema around the necrotic edge that was noticed yesterday.                        Pulses:   Pulses palpable in all extremities                            Skin:   Skin is warm and dry,  no rashes or palpable lesions                        Data Review:    I reviewed the patient's new clinical results.    Results from last 7 days  Lab Units 03/14/17  0949 03/12/17  1000 03/11/17  1539   WBC 10*3/mm3 8.89 10.55 12.76*   HEMOGLOBIN g/dL 8.9* 8.5* 10.6*   PLATELETS 10*3/mm3 438 370 357       Results from last 7 days  Lab Units 03/14/17  0949 03/13/17  2145 03/12/17  0849 03/11/17  1539   SODIUM mmol/L 138 138 138 136   POTASSIUM mmol/L 4.0 4.2 4.6 5.3*   CHLORIDE mmol/L 103 105 105 96*   TOTAL CO2 mmol/L 21.8* 20.1* 18.3* 19.2*   BUN mg/dL 15 20 35* 43*   CREATININE mg/dL 0.89 1.00 1.21* 2.14*   CALCIUM mg/dL 8.6 8.7 8.4* 9.3   GLUCOSE mg/dL 124* 138* 125* 285*    wound culture is pending.  Urine culture no growth so far  Assessment:  Principal Problem:    SUSIE (acute kidney injury)  Active Problems:    Urinary tract infection    Type 2 diabetes mellitus    Open wound of thigh    Dementia    CKD (chronic kidney disease) stage 3, GFR 30-59 ml/min      Plan: See below.  Okay to discharge from infectious disease standpoint with close follow-up with her surgeon in the outpatient setting.  She seems to have improved.  If wound culture does not  show any specific pathogen would recommend discontinuation of antibiotics as her UTI seems to have resolved metabolic encephalopathy has improved.  He currently discharges no antibiotics assuming that the cultures are negative.  We'll follow up cultures later.  Need follow-up with her wound surgeon at [facility] after she leaves the hospital in the meantime continue local care.   Diane Valdes MD  3/15/2017  10:36 AM    Much of this encounter note is an electronic transcription/translation of spoken language to printed text. The electronic translation of spoken language may permit erroneous, or at times, nonsensical words or phrases to be inadvertently transcribed; Although I have reviewed the note for such errors, some may still exist

## 2017-03-15 NOTE — PROGRESS NOTES
Continued Stay Note  [facility]     Patient Name: Carley Madden  MRN: [id number]  Today's Date: 3/15/2017    Admit Date: 3/11/2017          Discharge Plan       03/15/17 1458    Case Management/Social Work Plan    Plan [facility]    Patient/Family In Agreement With Plan yes    Additional Comments S/W daughterYunier and notified of [facility]  for 1600, after s/w [facility].  Notified [facility] of [facility]  for 1800.  IMM explained and signed by daughterYunier.  Hoa, RN, CCO    Final Note    Final Note orders to d/c      03/15/17 1203    Case Management/Social Work Plan    Plan [facility]    Patient/Family In Agreement With Plan yes    Additional Comments S/W [facility] and they have agreed to accept patient and daughterYunier is aware.  Notified Gemma[facility]  JEAN MARIE Camara, CCP              Discharge Codes     None        Expected Discharge Date and Time     Expected Discharge Date Expected Discharge Time    Mar 15, 2017             Arminda Armendariz RN

## 2017-03-15 NOTE — DISCHARGE SUMMARY
DATE OF ADMISSION:  03/11/2017    DATE OF DISCHARGE:  03/15/2017    CONSULTANTS:  1.  Diane Valdes MD, Infectious Disease  2.  Mau Ferrera MD, Neurology  3.  Mejia Mata MD, Cardiology    DISCHARGE DIAGNOSES:  1.  Acute renal failure, resolved, with past history of chronic kidney disease, stage 3.   2.  No urinary tract infection as culture is negative.   3.  Dementia without behavior disorder with metabolic encephalopathy, resolved.   4.  Open wound to right thigh; see Hospital Course.   5.  Diabetes mellitus type 2 with glipizide discontinued.   6.  B12 deficiency.     HOSPITAL COURSE: The patient is a pleasant 83-year-old female who unfortunately has fairly advanced dementia. She was initially admitted by my colleague, Dr. Alvarez, for complaints of altered mentation. See his history and physical for further clarification of admission diagnosis. There were concerns about possible UTI at admission and the patient was empirically placed on Rocephin, though urine culture at this time revealed no growth. She has received a total of 3 days' worth of Rocephin, and this will not be continued postdischarge. Neurology was consulted and they felt that this patient has aspects of dementia and that is most likely responsible for her waxing and waning in regard to worsening mentation. I concur with this and I do not feel there is any other acute neurological issue going on at this time. She did undergo a CT scan of the brain, and no further brain imaging was pursued. The CT did demonstrate chronic small vessel ischemic changes as well as chronic right cerebral hemisphere infarction, but no evidence of any acute intracranial pathology. She had some elevated cardiac troponins, which is most likely secondary to renal disease.  Cardiology was consulted while here, and I appreciate the input of Dr. Mata, but no further workup or cardiac investigation will be pursued. She has this open thigh wound to her right  thigh, which is probably about 6 inches in diameter by about 3-4 inches in width. We are currently packing this wound on with Dakin 0.25% topical solution daily and then covering it with a nonadhesive bandage. The daughter is actually going to look into her old medical records to try to figure out who was managing this wound as an outpatient as she does need additional outpatient followup for this wound. I attempted to consult Plastic Surgery while here, but unfortunately the Plastic Surgeon here did not take this patient's insurance. Ultimately, the daughter will help uncover who is managing this patient, and I think she can be followed up as an outpatient with the wound clinic. I think glipizide is also contributing to her waxing and waning in mentation as it likely causing profound hypoglycemia. With discontinuation of her glipizide, her blood sugars are still running in the low 100s, and this medication will be permanently discontinued. I will suggest the use of sliding scale insulin at subacute rehabilitation upon discharge with possible transition to a new oral regimen depending on blood sugars. She was also found to have some aspects of B12 deficiency, and while here we have supplemented her on daily injections and at discharge I will transition her over to oral B12 replacement. I have discussed the case at length with the patient as well as daughter, who is healthcare surrogate and POWER OF  at bedside.  I have also discussed the case with CCP as well as RN to help organize smooth transition. At the time of this dictation, her vital signs are stable, as well as afebrile     DISPOSITION: To rehabilitation.    FOLLOWUP:    1.  Patient needs to see PCP postrehabilitation.   2.  Follow up with Cardiology, Neurology, and Infectious Disease as needed.   3.  Daughter is to inquire to which wound clinic is managing this patient and she needs outpatient followup for that right thigh wound.     DISCHARGE  MEDICATIONS:  1.  Tylenol 650 mg q.4 h. p.r.n. pain or fever.   2.  Aspirin 81 mg daily.  3.  Norco 5/325 take 1 tablet q.6 h. p.r.n. for breakthrough pain, #20 dispensed, no refills.   4.  Isosorbide mononitrate 60 mg daily.   5.  Nitroglycerin sublingual p.r.n.   6.  NovoLog sliding scale before meals; see Hospital Course.   7.  Lipitor 40 mg daily.   8.  Lisinopril can be resumed postdischarge at 40 mg daily.     9.  Dakin 0.25% solution with wet to dry to the right thigh wound daily, then cover with nonadhesive bandage.   10.  Coreg 12.5 mg b.i.d.   11.  Ventralex twice daily to heel blisters.    12.  Collagenase ointment topical to right thigh wound daily.   13.  Lasix will not resume at discharge secondary to blood pressures and decreased oral intake, reinstate accordingly.   14.  Vitamin B12 take 1000 mcg daily.   15.  Plavix 75 mg daily.   16.  Levothyroxine 88 mcg daily.  17.  Zantac 150 mg twice daily.     DISCONTINUED MEDICATIONS:   1.  Naproxen.   2.  Glipizide.  3.  Florastor.   4.  Rocephin.    Well greater than 30 minutes was spent organizing discharge.            Ancelmo Montes MD  CUMMINGS:mikey  D:   03/15/2017 14:54:07  T:   03/15/2017 15:14:28  Job ID:   [id number]  Document ID:   [id number]  cc:

## 2017-03-15 NOTE — CONSULTS
Carley Madden   83 y.o.  female    LOS: 4 days   Patient Care Team:  Heath Velasco DO as PCP - General (Family Medicine)      Subjective     Patient Complaints: Admitted with altered mental status    History of Present Illness: She is a poor historian and is not well oriented.  She did tell me that she's not complaining of any chest pain and she is not admitting to any significant shortness of air.  It's difficult to get much else in the way of history from the patient.  She is not well oriented.  I have reviewed some of the prior records in addition to the current data on the computer here.  She had an admission to [facility] in January due to the wound on her thigh and she continues to be followed as an outpatient by one of the surgeons there.  During that admission she had an episode of bradycardia and hypotension temporarily was transferred to the intensive care unit.  I think she may have been seen by cardiology there but I couldn't find any notes from them on the prior records.  She does have some cardiac history in the past and see the assessment below for complete details.  Currently admitted for the altered mental status which seems to have improved somewhat but she still seems lethargic and is not well oriented.She has had a recent urinary tract infection.      Complains of no PND, orthopnea, syncope or presyncope.  No edema.  .    Review of Systems:   Difficult to obtain much of a review of systems from the patient due to rectal status.  She has a chronic dementia and felt that also may be have a metabolic encephalopathy by neurology.    Medication Review:   Current Facility-Administered Medications:   •  acetaminophen (TYLENOL) tablet 650 mg, 650 mg, Oral, Q4H PRN, Hari Alvarez MD  •  aspirin chewable tablet 81 mg, 81 mg, Oral, Daily, Hari Alvarez MD, 81 mg at 03/14/17 1128  •  atorvastatin (LIPITOR) tablet 40 mg, 40 mg, Oral, Daily, Hari Alvarez MD, 40 mg at 03/14/17 1128  •   carvedilol (COREG) tablet 12.5 mg, 12.5 mg, Oral, BID With Meals, Hari Alvarez MD, 12.5 mg at 03/14/17 1839  •  castor oil-balsam peru (VENELEX) ointment 1 application, 1 application, Topical, Q12H, Hari Alvarez MD, 1 application at 03/14/17 2105  •  cefTRIAXone (ROCEPHIN) IVPB 2 g, 2 g, Intravenous, Q24H, Ancelmo Montes MD, 2 g at 03/14/17 2105  •  clopidogrel (PLAVIX) tablet 75 mg, 75 mg, Oral, Daily, Hari Alvarez MD, 75 mg at 03/14/17 1128  •  collagenase ointment 1 application, 1 application, Topical, Daily, Hari Alvarez MD, 1 application at 03/14/17 1847  •  cyanocobalamin injection 1,000 mcg, 1,000 mcg, Intramuscular, Daily, Amos Linares MD, 1,000 mcg at 03/14/17 1839  •  dakin's (HYSEPT) 0.25 % topical solution 1 mL, 1 application, Topical, Q24H, Hari Alvarez MD, 1 mL at 03/14/17 1839  •  dextrose (D50W) solution 25 g, 25 g, Intravenous, Q15 Min PRN, Hari Alvarez MD  •  dextrose (GLUTOSE) oral gel 15 g, 15 g, Oral, Q15 Min PRN, Hari Alvarez MD  •  enoxaparin (LOVENOX) syringe 30 mg, 30 mg, Subcutaneous, Daily, Hari Alvarez MD, 30 mg at 03/13/17 0931  •  famotidine (PEPCID) tablet 20 mg, 20 mg, Oral, BID, Hari Alvarez MD, 20 mg at 03/14/17 1846  •  glucagon (human recombinant) (GLUCAGEN DIAGNOSTIC) injection 1 mg, 1 mg, Subcutaneous, Q15 Min PRN, Hari Alvarez MD  •  HYDROcodone-acetaminophen (NORCO) 5-325 MG per tablet 1 tablet, 1 tablet, Oral, Q6H PRN, Amos Linares MD, 1 tablet at 03/15/17 0119  •  insulin aspart (novoLOG) injection 0-7 Units, 0-7 Units, Subcutaneous, 4x Daily AC & at Bedtime, Hari Alvarez MD, 2 Units at 03/13/17 2121  •  isosorbide mononitrate (IMDUR) 24 hr tablet 60 mg, 60 mg, Oral, Daily, Hari Alvarez MD, 60 mg at 03/14/17 1128  •  levothyroxine (SYNTHROID, LEVOTHROID) tablet 88 mcg, 88 mcg, Oral, Q AM, Hari Alvarez MD, 88 mcg at 03/15/17 0551  •  nitroglycerin (NITROSTAT) SL tablet  0.4 mg, 0.4 mg, Sublingual, Q5 Min PRN, Hari Alvarez MD  •  ondansetron (ZOFRAN) tablet 4 mg, 4 mg, Oral, Q6H PRN **OR** ondansetron ODT (ZOFRAN-ODT) disintegrating tablet 4 mg, 4 mg, Oral, Q6H PRN **OR** ondansetron (ZOFRAN) injection 4 mg, 4 mg, Intravenous, Q6H PRN, Hari Alvarez MD  •  saccharomyces boulardii (FLORASTOR) capsule 250 mg, 250 mg, Oral, BID, Ancelmo Montes MD, 250 mg at 03/14/17 1840  •  sodium chloride 0.9 % bolus 1,000 mL, 1,000 mL, Intravenous, Once, Gus Mccoy MD  •  sodium chloride 0.9 % flush 1-10 mL, 1-10 mL, Intravenous, PRN, Hari Alvarez MD      Family History   Problem Relation Age of Onset   • Hypertension Other      Social History     Social History   • Marital status:      Spouse name: N/A   • Number of children: N/A   • Years of education: N/A     Occupational History   • Not on file.     Social History Main Topics   • Smoking status: Former Smoker     Packs/day: 1.00     Years: 15.00     Types: Cigarettes   • Smokeless tobacco: Not on file   • Alcohol use No   • Drug use: No   • Sexual activity: No     Other Topics Concern   • Not on file     Social History Narrative     Objective   Past Surgical History   Procedure Laterality Date   • Back surgery       Past Medical History   Diagnosis Date   • Bradycardia 1/12/2017   • Cardiomyopathy 12/13/2013   • COPD (chronic obstructive pulmonary disease)    • Dementia 3/11/2017   • Diabetes mellitus    • Diastolic dysfunction 12/13/2013   • Hyperlipidemia 12/13/2013   • Hypertension    • Mitral valve insufficiency 12/13/2013   • Normocytic anemia 1/12/2017   • Open wound of thigh 1/12/2017   • Renal artery stenosis 12/13/2013   • Stenosis of carotid artery 12/13/2013   • Stroke    • TIA (transient ischemic attack)    • Type 2 diabetes mellitus 12/13/2013       Vital Sign Min/Max for last 24 hours  Temp  Min: 97.8 °F (36.6 °C)  Max: 99 °F (37.2 °C)   BP  Min: 138/72  Max: 162/66    Pulse  Min: 55  Max: 68      Wt Readings from Last 3 Encounters:   03/13/17 134 lb 0.6 oz (60.8 kg)        Physical Exam:      General Appearance:    Alert,  in no acute distress   Head:    Normocephalic, without obvious abnormality, atraumatic   Eyes:            Conjunctivae normal, no   icterus   Neck:   No adenopathy, supple, trachea midline, no thyromegaly, no   carotid bruit, no JVD   Lungs:     Clear to auscultation,respirations regular, even and                  unlabored    Heart:    Regular rhythm with an occasional ectopic heard and normal rate, normal S1 and S2,            2/6 systolic murmur, no gallop, no rub, no click   Chest Wall:    No abnormalities observed   Abdomen:     Normal bowel sounds, no masses, no organomegaly, soft        non-tender, non-distended, no guarding, no rebound                tenderness   Rectal:     Deferred   Extremities:   No edema. Moves all extremities well, no cyanosis, no erythema. has a chronic wound on the right thigh        Skin:   No bleeding, bruising or rash   Neurologic:   she is not oriented to place or time         Results Review:     I reviewed the patient's new clinical results.  POTASSIUM   Date Value Ref Range Status   03/14/2017 4.0 3.5 - 5.2 mmol/L Final     CREATININE   Date Value Ref Range Status   03/14/2017 0.89 0.57 - 1.00 mg/dL Final     TROPONIN T   Date Value Ref Range Status   03/14/2017 0.045 (H) 0.000 - 0.030 ng/mL Final      @LASTLAB(inr)  Echo EF Estimated  )No results found for: ECHOEFEST    Sodium SODIUM   Date Value Ref Range Status   03/14/2017 138 136 - 145 mmol/L Final   03/13/2017 138 136 - 145 mmol/L Final      Potassium POTASSIUM   Date Value Ref Range Status   03/14/2017 4.0 3.5 - 5.2 mmol/L Final   03/13/2017 4.2 3.5 - 5.2 mmol/L Final      Chloride CHLORIDE   Date Value Ref Range Status   03/14/2017 103 98 - 107 mmol/L Final   03/13/2017 105 98 - 107 mmol/L Final      Bicarbonate No results found for: PLASMABICARB   BUN BUN   Date Value Ref Range Status    03/14/2017 15 8 - 23 mg/dL Final   03/13/2017 20 8 - 23 mg/dL Final      Creatinine CREATININE   Date Value Ref Range Status   03/14/2017 0.89 0.57 - 1.00 mg/dL Final   03/13/2017 1.00 0.57 - 1.00 mg/dL Final      Calcium CALCIUM   Date Value Ref Range Status   03/14/2017 8.6 8.6 - 10.5 mg/dL Final   03/13/2017 8.7 8.6 - 10.5 mg/dL Final      Magnesium No results found for: MG       Results from last 7 days  Lab Units 03/14/17  0949   WBC 10*3/mm3 8.89   HEMOGLOBIN g/dL 8.9*   HEMATOCRIT % 27.3*   PLATELETS 10*3/mm3 438       0  Lab Value Date/Time   TROPONINT 0.045 (H) 03/14/2017 0949   TROPONINT 0.039 (H) 03/12/2017 0849   TROPONINT 0.049 (H) 03/11/2017 1539     No results found for: CHOL  No results found for: HDL  No results found for: LDL  No results found for: TRIG  No components found for: CHOLHDL  [unfilled]        Assessment/ Plan    Principal Problem:    SUSIE (acute kidney injury)  Active Problems:    Urinary tract infection    Type 2 diabetes mellitus    Open wound of thigh    Dementia    CKD (chronic kidney disease) stage 3, GFR 30-59 ml/min   she was in [facility] in January 2017 and while there had an episode of bradycardia and hypotension, was temporarily moved to the ICU.  In 2015 she had a Lexiscan Cardiolite which demonstrated a possible very small area of ischemia in the distal anterior wall however he had a cardiac catheterization done around 2012 which showed no coronary artery disease  History of hypertension, mention of a previous renal artery stenosis and stent in the old records  There is mention of a prior stroke in the old records  Possible past history of COPD  History of mild bilateral carotid artery stenosis.  Elevated troponins  Plan #1 I reviewed all of the troponins and are all in the indeterminate range and remained at about the same level.  Her initial EKG was unremarkable.  I don't have the impression that there is any acute cardiac issue at this point.  As indicated  above she's reported to have had a cardiac catheterization around 2012 which did not demonstrate any coronary artery disease.  However she is on some isosorbide mononitrate.  #2 I reviewed her current cardiovascular medicines and for now I would just continue with those.  I don't think we need to pursue any other cardiac studies at this point unless something changes clinically.  No family members are here at the time of my visit.  Mejia Mata MD  03/15/17  10:11 AM      Time: 40 minutes clearing performing the history and physical exam, review of all the current data on the computer here and reviewing the data from the outside records on the computer.

## 2017-03-15 NOTE — PLAN OF CARE
Problem: Patient Care Overview (Adult)  Goal: Plan of Care Review  Outcome: Ongoing (interventions implemented as appropriate)    03/15/17 0417   Coping/Psychosocial Response Interventions   Plan Of Care Reviewed With patient   Patient Care Overview   Progress improving   Outcome Evaluation   Outcome Summary/Follow up Plan pt tolerated IV rocephin. medicated for pain as needed. no s/s of acute distress noted.       Goal: Adult Individualization and Mutuality  Outcome: Ongoing (interventions implemented as appropriate)  Goal: Discharge Needs Assessment  Outcome: Ongoing (interventions implemented as appropriate)    Problem: Skin Integrity Impairment, Risk/Actual (Adult)  Goal: Skin Integrity/Wound Healing  Outcome: Ongoing (interventions implemented as appropriate)    Problem: Fall Risk (Adult)  Goal: Absence of Falls  Outcome: Ongoing (interventions implemented as appropriate)

## 2017-03-15 NOTE — PROGRESS NOTES
"Adult Nutrition  Assessment/PES    Patient Name:  Carley Madden  YOB: 1933  MRN: [id number]  Admit Date:  3/11/2017    Assessment Date:  3/15/2017     Comments:  Nothing recorded from doris count from yesterday.  Few bites of breakfast this am.  95% of magic cup this am.  Will follow for results.          Reason for Assessment       03/15/17 1453    Reason for Assessment    Reason For Assessment/Visit follow up protocol;calorie count order                Anthropometrics       03/15/17 1454    Anthropometrics    Height 157.5 cm (62.01\")    Weight 60.8 kg (134 lb 0.6 oz)    Ideal Body Weight (IBW)    Ideal Body Weight (IBW), Female 50.85    % Ideal Body Weight 119.82    Body Mass Index (BMI)    BMI (kg/m2) 24.56    BMI Grade 19.1 - 24.9 - normal            Labs/Tests/Procedures/Meds       03/15/17 1454    Labs/Tests/Procedures/Meds    Diagnostic Test/Procedure Review reviewed    Labs/Tests Review Glucose;Reviewed    Medication Review Reviewed, pertinent;Antacid;Insulin   vit B12    Significant Vitals reviewed            Physical Findings       03/15/17 1455    Physical Findings/Assessment    Additional Documentation Physical Appearance (Group)    Physical Appearance    Skin --   R lateral thigh laceration, B=13              Nutrition Prescription Ordered       03/15/17 1455    Nutrition Prescription PO    Current PO Diet Dysphagia    Dysphagia Level 2  Pureed    Fluid Consistency Nectar/syrup thick    Supplement Magic Cup    Supplement Frequency 2 times a day    Common Modifiers Consistent Carbohydrate            Evaluation of Received Nutrient/Fluid Intake       03/15/17 1456    Evaluation of Received Nutrient/Fluid Intake    Nutrition Delivered Calorie Evaluation;Protein Evaluation    Calorie Intake Evaluation    Oral Calories (kcal) --   nothing recorded from calorie count from yesterday     PO Evaluation    Number of Days PO Intake Evaluated Insufficient Data   calorie count in progress    Number " of Meals 5    % PO Intake 0-50              Problem/Interventions:                  Intervention Goal       03/15/17 1457    Intervention Goal    General Maintain nutrition    PO Tolerate PO;Increase intake;PO intake (%)    PO Intake % 75 %    Weight Maintain weight            Nutrition Intervention       03/15/17 1457    Nutrition Intervention    RD/Tech Action Follow Tx progress;Care plan reviewd;Encourage intake;Supplement provided              Education/Evaluation       03/15/17 1457    Education    Education Will Instruct as appropriate    Monitor/Evaluation    Monitor Per protocol;Supplement intake;PO intake            Electronically signed by:  Juanis Prince RD  03/15/17 2:58 PM

## 2017-03-15 NOTE — PROGRESS NOTES
Continued Stay Note  [facility]     Patient Name: Carley Madden  MRN: [id number]  Today's Date: 3/15/2017    Admit Date: 3/11/2017          Discharge Plan       03/15/17 1203    Case Management/Social Work Plan    Plan [facility]    Patient/Family In Agreement With Plan yes    Additional Comments S/W [facility] and they have agreed to accept patient and daughterYunier is aware.  Notified [facility] of the acceptance to [facility].  JEAN MARIE Camara, CCP      03/15/17 1130    Case Management/Social Work Plan    Plan [facility] vs. [facility]    Patient/Family In Agreement With Plan yes    Additional Comments S/W [facility] and she states that patient came from a skilled/Medicaid bed and will have a bed at d/c, if she choses to go to [facility] instead of [facility].  Waiting on notification from daughterYunier on what [facility] has said, re: insurance.  Hoa RN, CCP              Discharge Codes     None            Arminda Armendariz RN

## 2017-03-15 NOTE — SIGNIFICANT NOTE
03/15/17 1459   PT Discharge Summary   Reason for Discharge Discharge from facility   Outcomes Achieved Refer to plan of care for updates on goals achieved   Discharge Destination SNF

## 2017-03-16 NOTE — PROGRESS NOTES
Continued Stay Note  [facility]     Patient Name: Carley Madden  MRN: [id number]  Today's Date: 3/16/2017    Admit Date: 3/11/2017          Discharge Plan       03/16/17 0756    Case Management/Social Work Plan    Plan [facility]    Final Note    Final Note Left by [facility] at 1618 to [facility]              Discharge Codes       03/16/17 0756    Discharge Codes    Discharge Codes 03  Discharged/transferred to skilled nursing facility (SNF) with Medicare certification in anticipation of skilled care        Expected Discharge Date and Time     Expected Discharge Date Expected Discharge Time    Mar 15, 2017  6:13 PM            Arminda Armendariz RN

## 2017-04-04 PROBLEM — A41.9 SEPSIS (HCC): Status: ACTIVE | Noted: 2017-01-01

## 2017-04-04 PROBLEM — E13.69: Status: ACTIVE | Noted: 2017-01-01

## 2017-04-04 PROBLEM — M62.89: Status: ACTIVE | Noted: 2017-01-01

## 2017-04-04 NOTE — CONSULTS
Name: Carley Madden ADMIT: 2017   : 1933  PCP: Heath Velasco DO    MRN: [id number] LOS: 0 days   AGE/SEX: 84 y.o. female  ROOM: 374/1     Inpatient Consult to General Surgery  Consult performed by: HOLGER BOATENG  Consult ordered by: AMPARO RAY MD     LOS: 0 days   Patient Care Team:  Heath Velasco DO as PCP - General (Family Medicine)    Subjective     History of Present Illness  84 y.o. female in the intensive carrying on the ventilator.  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  Patient has a Doppler right femoral pulse.  Patient has a left palpable femoral pulse and Doppler left pedal pulse but left leg is pulled up and contracted.  Patient does withdrawal both upper extremities to painful stimuli.  Patient has a large distal right medial thigh wound with obvious muscle necrosis.  Cultures were taken.  In discussion with patient's daughter Yunier who is her power of  patient has had right leg problems with diabetes mellitus and diabetic peripheral vascular disease for years.  She has a long incision right thigh apparently from a previous failed attempt at bypass years ago in [place].  She states she is also had stents placed and right leg suther[place] last few years at [facility].  Patient has had a right thigh wound since 2016 care for by the wound center at [facility].  Patient's wound has progressively enlarged.  Patient was here last month at [facility] and seen by infectious disease who continued antibiotics and local wound care to the right thigh wound.  Patient now has a cadaveric right leg with myonecrosis and a cold pulseless leg from the mid thigh down.    Patient has known cardiomyopathy and mitral insufficiency.  Patient's power of  states she has had no previous surgeries of her heart.  She was  admitted with sepsis and on high-dose pressors and intubated nasally.  Patient has known chronic kidney disease stage III and as well as COPD and severe dementia.  She is a nursing home resident and has not ambulated for several months according to the patient's daughter who is part of .  Patient also with severe anemia this admission with hemoglobin 6.7.  She also has hypertension, diabetes mellitus, history transient ischemic attack and stroke in past, history of renal and carotid stenosis, and history of degenerative arthritis and history of back surgery lower lumbar area past.    Long discussion with patient's power of  regarding all the above findings.  The right leg is not salvageable and is cadaveric.  Discussed options with family of major amputation above the knee which could do as early as tomorrow if patient is stabilized.  Other option would be to consider palliative care with hospice with no major surgery.  Discussed both options in detail with patient's family and power of  and they will consider options tonight.    Review of Systems   Unable to perform ROS: Intubated       Past Medical History:   Diagnosis Date   • Bradycardia 1/12/2017   • Cardiomyopathy 12/13/2013   • Chronic kidney disease, stage 3    • COPD (chronic obstructive pulmonary disease)    • Dementia 3/11/2017   • Diabetes mellitus    • Diastolic dysfunction 12/13/2013   • Hyperlipidemia 12/13/2013   • Hypertension    • Mitral valve insufficiency 12/13/2013   • Normocytic anemia 1/12/2017   • Open wound of thigh 1/12/2017   • Renal artery stenosis 12/13/2013   • Stenosis of carotid artery 12/13/2013   • Stroke    • TIA (transient ischemic attack)    • Type 2 diabetes mellitus 12/13/2013       Past Surgical History:   Procedure Laterality Date   • BACK SURGERY         Family History   Problem Relation Age of Onset   • Hypertension Other        Social History   Substance Use Topics   • Smoking status: Former Smoker      Packs/day: 1.00     Years: 15.00     Types: Cigarettes   • Smokeless tobacco: None   • Alcohol use No       Allergies: Review of patient's allergies indicates no known allergies.    Prescriptions Prior to Admission   Medication Sig Dispense Refill Last Dose   • acetaminophen (TYLENOL) 325 MG tablet Take 2 tablets by mouth Every 4 (Four) Hours As Needed for Mild Pain (1-3).  0    • aspirin 81 MG chewable tablet Chew 81 mg Daily.      • atorvastatin (LIPITOR) 40 MG tablet Take 40 mg by mouth Daily.      • carvedilol (COREG) 12.5 MG tablet Take 12.5 mg by mouth 2 (Two) Times a Day With Meals.      • castor oil-balsam peru (VENELEX) ointment Apply 1 application topically 2 (Two) Times a Day. To heel blisters      • clopidogrel (PLAVIX) 75 MG tablet Take 75 mg by mouth Daily.      • collagenase 250 UNIT/GM ointment Apply 1 application topically Daily. ro wound on right thigh      • dakin's (HYSEPT) 0.25 % solution topical solution Apply 1 application topically Daily. To pack right thigh wound daily and cover with optifoam      • ferrous sulfate 325 (65 FE) MG tablet Take 325 mg by mouth Daily With Breakfast.      • HYDROcodone-acetaminophen (NORCO) 5-325 MG per tablet Take 1 tablet by mouth Every 6 (Six) Hours As Needed for moderate pain (4-6).      • insulin aspart (novoLOG) 100 UNIT/ML injection Inject 0-7 Units under the skin 4 (Four) Times a Day Before Meals & at Bedtime.  12    • isosorbide mononitrate (IMDUR) 60 MG 24 hr tablet Take 60 mg by mouth Daily.      • levothyroxine (SYNTHROID, LEVOTHROID) 88 MCG tablet Take 88 mcg by mouth Every Morning.      • lisinopril (PRINIVIL,ZESTRIL) 40 MG tablet Take 40 mg by mouth Daily.      • Multiple Vitamins-Minerals (CENTRUM ADULTS PO) Take  by mouth.      • mupirocin (BACTROBAN) 2 % ointment Apply  topically 3 (Three) Times a Day.      • nitroglycerin (NITROSTAT) 0.4 MG SL tablet Place 0.4 mg under the tongue Every 5 (Five) Minutes As Needed for chest pain. Take no more  than 3 doses in 15 minutes.      • raNITIdine (ZANTAC) 150 MG tablet Take 150 mg by mouth 2 (Two) Times a Day.      • vitamin B-12 (VITAMIN B-12) 1000 MCG tablet Take 1 tablet by mouth Daily.      • vitamin C (ASCORBIC ACID) 500 MG tablet Take 500 mg by mouth Daily.      • ZINC SULFATE PO Take 220 mg by mouth.      • furosemide (LASIX) 20 MG tablet Take 20 mg by mouth.      • glipiZIDE (GLUCOTROL) 5 MG tablet Take 2.5 mg by mouth.      • metFORMIN (GLUCOPHAGE) 500 MG tablet Take 500 mg by mouth.      • naproxen (EC NAPROSYN) 500 MG EC tablet Take 500 mg by mouth.          enoxaparin 30 mg Subcutaneous Daily   [START ON 4/5/2017] pantoprazole 40 mg Intravenous QAM AC   piperacillin-tazobactam 4.5 g Intravenous Q12H   vancomycin 20 mg/kg Intravenous Once   Vancomycin Pharmacy Intermittent Dosing  Does not apply Daily       norepinephrine 0.02-0.3 mcg/kg/min Last Rate: 0.28 mcg/kg/min (04/04/17 1436)   Pharmacy to dose vancomycin     phenylephrine 0.5-3 mcg/kg/min    propofol 5-50 mcg/kg/min    sodium chloride 250 mL/hr Last Rate: 250 mL/hr (04/04/17 0907)     •  acetaminophen **OR** acetaminophen  •  aluminum-magnesium hydroxide-simethicone  •  bisacodyl  •  bisacodyl  •  calcium carbonate  •  FentaNYL Citrate (PF)  •  ipratropium-albuterol  •  ondansetron **OR** ondansetron ODT **OR** ondansetron  •  Pharmacy to dose vancomycin  •  sodium chloride      Objective     Physical Exam   Constitutional: She appears distressed. She is intubated.   HENT:   Head: Atraumatic.   Nose: Nose normal.   Eyes: Pupils are equal, round, and reactive to light. No scleral icterus.   Neck: No JVD present. Carotid bruit is not present. No tracheal deviation and no edema present. No thyroid mass present.   Cardiovascular: An irregular rhythm present. Tachycardia present.    Pulses:       Femoral pulses are 0 on the right side, and 2+ on the left side.       Dorsalis pedis pulses are 0 on the right side, and 0 on the left side.   Doppler  right femoral pulse, no doppler right popliteal or pedal pulses with cold cadaveric distal right thigh and calf    Palpable left femoral pulse and doppler left dorsalis pulse   Pulmonary/Chest: She is intubated. She is in respiratory distress. She has no wheezes. She has rales.   Abdominal: She exhibits no distension and no mass. No hernia.   Musculoskeletal: She exhibits deformity (right left large open wound necrotic and cadaveric right leg, left leg contracure at knee). She exhibits no edema.       Vascular Status -  Her exam exhibits right foot vasculature abnormal and no right foot edema. Her exam exhibits left foot vasculature abnormal and no left foot edema.   Skin Integrity  -  Her right foot skin is not intact.    Carley 's left foot skin is intact. .     Lymphadenopathy:     She has no cervical adenopathy.   Neurological:   Sedated on ventilator, no movement right leg, left leg with knee contracture, withdrawal both arms to painful stimuli   Skin:        Vitals reviewed.        Results from last 7 days  Lab Units 04/04/17  0806   WBC 10*3/mm3 18.02*   HEMOGLOBIN g/dL 6.7*   PLATELETS 10*3/mm3 469     Results from last 7 days  Lab Units 04/04/17  0733   SODIUM mmol/L 148*   POTASSIUM mmol/L 5.3*   CHLORIDE mmol/L 114*   TOTAL CO2 mmol/L 6.6*   BUN mg/dL 36*   CREATININE mg/dL 2.46*   GLUCOSE mg/dL 178*   Estimated Creatinine Clearance: 17.2 mL/min (by C-G formula based on Cr of 2.46).      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     Problem Points:  4:  Patient has a new problem, with additional work-up planned  Total problem points:4 or more    Data Points:  1:  I have reviewed or order clinical lab test  1:  I have discussed  test with performing physician  2:  I have personally and independently review of image, tracing, or specimen  2:  I have reviewed and summation of old records and/or discussed the patients care with another health care provider  Total data points:4 or more    Risk Points:  High:  Any illness that poses threat to life or body funciton    MDM Prob point Data point Risk   SF 1 1 Minimal   Low 2 2 Low   Mod 3 3 Moderate   High 4 4 High     Code MDM History Exam Time   29487 SF/Low Detailed Detailed 30   25454 Mod Comprehensive Comprehensive 50   52995 High Comprehensive Comprehensive 70     Detailed history:  4 elements HPI or status of 3 chronic problems; 2-9 system ROS  Comprehensive:  4 elements HPI or status of 3 chronic problems;  10 system ROS    Detailed Exam:  12 findings from any organ system  Comprehensive Exam:  2 findings from each of 9 systems.     Billin    Assessment/Plan     Principal Problem:    Secondary diabetes mellitus with myonecrosis  Active Problems:    Sepsis        84 y.o. female with cadaveric right lower extremity and mild necrosis and large right thigh wound.  Patient is septic, intubated, on high-dose pressors.  Patient with multiple medical problems.  Long discussion with family.  I spent 75 minutes with patient today in consultation.  After discussion with prior of  she will decide whether to proceed with major right above-knee amputation or consider palliative care with hospice.  Continue aggressive resuscitation and transfusion tonight.  Could proceed with right above-knee amputation as early as tomorrow if patient and family decided to proceed with surgery.    I discussed the patients findings and my recommendations with family and nursing staff.  Please call my office with any question: [phone]    Mejia Arango MD  17  4:47 PM

## 2017-04-04 NOTE — ED NOTES
Dr. Vizcaino calledback. Was told to have IV team come and insert PICC line. No further orders at this time.      Sb Maldonado RN  04/04/17 7994

## 2017-04-04 NOTE — ED NOTES
Lab notified of difficult stick. Will send someone down to draw Type and screen.     Sb Maldonado RN  04/04/17 7165

## 2017-04-04 NOTE — H&P
[facility]  HISTORY AND PHYSICAL   [facility]        Patient Identification:  Name: Carley Madden  Age: 84 y.o.  Sex: female  :  1933  MRN: [id number]                     Primary Care Physician: Heath Velasco DO    Chief Complaint:  Unresponsive at nursing home    History of Present Illness:   84-year-old female presents after being found unresponsive this morning by nursing home staff.  Per verbal report and discussion with the emergency room staff saturations upon arrival by EMS were 50%.  Patient was nasally intubated in route.  Was recently in the hospital under hospitalist service.  Discharged on March 15.  Has an open wound to the right thigh which has been chronic but apparently getting worse.  Patient is in the ER currently on the ventilator on Levophed.  Noted to have decreased hemoglobin and packed red blood cells have been ordered.  Unable to obtain any history from patient and no current family is available.  Has baseline dementia but apparently is able to ambulate with assistance of a walker.  Elevated lactic acid level noted and patient started on antibiotics for sepsis.  Blood, urine and sputum being sent for culture analysis.    Past Medical History:  Past Medical History:   Diagnosis Date   • Bradycardia 2017   • Cardiomyopathy 2013   • Chronic kidney disease, stage 3    • COPD (chronic obstructive pulmonary disease)    • Dementia 3/11/2017   • Diabetes mellitus    • Diastolic dysfunction 2013   • Hyperlipidemia 2013   • Hypertension    • Mitral valve insufficiency 2013   • Normocytic anemia 2017   • Open wound of thigh 2017   • Renal artery stenosis 2013   • Stenosis of carotid artery 2013   • Stroke    • TIA (transient ischemic attack)    • Type 2 diabetes mellitus 2013     Past Surgical History:  Past Surgical History:   Procedure Laterality Date   • BACK SURGERY        Home Meds:    (Not in  "a hospital admission)    Allergies:  No Known Allergies  Immunizations:    There is no immunization history on file for this patient.  Social History:   Social History     Social History Narrative   • No narrative on file     Social History   Substance Use Topics   • Smoking status: Former Smoker     Packs/day: 1.00     Years: 15.00     Types: Cigarettes   • Smokeless tobacco: Not on file   • Alcohol use No     Family History:  Family History   Problem Relation Age of Onset   • Hypertension Other         Review of Systems  Unobtainable secondary to altered mental status    Objective:  tMax 24 hrs: Temp (24hrs), Av.1 °F (35.1 °C), Min:94.5 °F (34.7 °C), Max:96 °F (35.6 °C)    Vitals Ranges:   Temp:  [94.5 °F (34.7 °C)-96 °F (35.6 °C)] 96 °F (35.6 °C)  Heart Rate:  [57-87] 74  Resp:  [12-27] 12  BP: ()/(24-79) 132/61  FiO2 (%):  [40 %-100 %] 40 %      Exam:  /61 (BP Location: Left arm, Patient Position: Lying)  Pulse 74  Temp 96 °F (35.6 °C) (Rectal)   Resp 12  Ht 68\" (172.7 cm)  Wt 150 lb (68 kg)  SpO2 100%  BMI 22.81 kg/m2    GENERAL APPEARANCE:   · Well developed  · Well nourished    EYES:    · PERRL                                                                           · Conjunctiva normal  · Sclera non-icteric.  HENT:   · Atraumatic, normocephalic  · External ears and nose normal  · Moist mucus membranes and no ulcers  · Nasally intubated  NECK:  · Thyroid not enlarged  · Trachea midline   RESPIRATORY:    · Nonlabored breathing   · Coarse bilateral breath sounds  · No rales. No wheezing  · No dullness  CARDIOVASCULAR:    · RRR  · Normal S1, S2  · No murmur  · Lower extremity edema: none    GI:   · Bowel sounds normal  · Abdomen soft , non-distended, non-tender  · No abdominal masses.    MUSCULOSKELETAL:  · Normal movement of extremities  · No tenderness, no deformities  · No clubbing or cyanosis   · 12 x 5 cm right medial thigh wound  Skin:    · No visible rashes  · No palpable " nodules  PSYCHIATRIC:  · Speech and behavior appropriate  · Normal mood and affect  · Oriented to person, place and time  NEUROLOGIC:  .    Data Review:  Lab Results   Component Value Date    WBC 18.02 (H) 04/04/2017    HGB 6.7 (C) 04/04/2017    HCT 23.7 (L) 04/04/2017     04/04/2017     Lab Results   Component Value Date     (H) 04/04/2017    K 5.3 (H) 04/04/2017     (H) 04/04/2017    CO2 6.6 (L) 04/04/2017    BUN 36 (H) 04/04/2017    CREATININE 2.46 (H) 04/04/2017    GLUCOSE 178 (H) 04/04/2017     Lab Results   Component Value Date    CALCIUM 8.0 (L) 04/04/2017     Lab Results   Component Value Date     (H) 04/04/2017     (H) 04/04/2017    ALKPHOS 115 04/04/2017     No results found for: APTT, INR  No results found for: COLORU, CLARITYU, SPECGRAV, PHUR, PROTEINUR, GLUCOSEU, KETONESU, BLOODU, NITRITE, LEUKOCYTESUR, BILIRUBINUR, UROBILINOGEN, RBCUA, WBCUA, BACTERIA  Lab Results   Component Value Date    TROPONINT 0.367 (C) 04/04/2017       Results from last 7 days  Lab Units 04/04/17  0723   PH, ARTERIAL pH units 7.292*   PO2 ART mm Hg 562.3*   PCO2, ARTERIAL mm Hg 26.4*   HCO3 ART mmol/L 12.7*     Lactic acid 9.1  Pro-calcitonin 0.96     chest X-ray shows 0.8 cm pulmonary nodule and no infiltrates or masses otherwise seen    Assessment:  Respiratory failure on ventilator  Lactic acidosis  Sepsis  Altered mental status/metabolic encephalopathy  Right thigh open wound  Elevated troponin  Hyperkalemia  Acute on chronic kidney disease  Anemia  History cardiomyopathy  Diabetes mellitus  Baseline dementia  Elevated LFTs    Plan:  Admit to the intensive care unit  Packed red blood cells for anemia  Antibiotics for sepsis and panculture  Continue IV fluids and pressors as needed for resuscitation  Recheck lactic acid level and troponin   Consult general surgery for wound   Place line  DVT/Ulcer prophylaxis     Cct: 46 min    Gus Vizcaino MD  [facility], [facility]  Pulmonary and  Critical Care Medicine    4/4/2017  3:01 PM

## 2017-04-04 NOTE — ED NOTES
EMS reports pt was unresponsive and unconscious upon arrival. BP 60/40 HR 43. Pt was nasally intubated en route. Blood sugar 202.     Melina Quinteros RN  04/04/17 0641

## 2017-04-04 NOTE — NURSING NOTE
Accessed pt for PICC placement. Pt has no accessible veins in rt or lt arm for PICC. Placed iv with u/s in REEMA for blood. Pt currently has IO in rt shoulder and 22G PIV. Pt also has a GFR23 and creat 2.4 at this time which needs renal approval. Sb AJ to talk with Dr Vizcaino about a central line in neck.

## 2017-04-04 NOTE — PROGRESS NOTES
"Pharmacokinetic Consult - Vancomycin Dosing (Initial Note)    Carley Madden is an 83 y/o female being initiated on vancomycin therapy for sepsis with pharmacy to dose per Dr. Vizcaino's request. Also receiving extended infusion Zosyn as part of the antimicrobial regimen.    - HPI: Presents from NH after being found unresponsive    Goal trough: 15-20 mg/L     Relevant clinical data and objective history reviewed:  84 y.o. female 68\" (172.7 cm) 150 lb (68 kg)    Creatinine   Date Value Ref Range Status   04/04/2017 2.46 (H) 0.57 - 1.00 mg/dL Final     BUN   Date Value Ref Range Status   04/04/2017 36 (H) 8 - 23 mg/dL Final     Estimated Creatinine Clearance: 17.2 mL/min (by C-G formula based on Cr of 2.46).    Lab Results   Component Value Date    WBC 18.02 (H) 04/04/2017     Temp Readings from Last 3 Encounters:   04/04/17 96 °F (35.6 °C) (Rectal)   03/15/17 98.6 °F (37 °C) (Oral)      Baseline culture/source/susceptibility:   - Blood cultures pending  - Respiratory panel pending    Assessment/Plan  - Noted labs: lactic acid 9.1 (now 6.8), WBC 18, PCT 0.96  - Renal function: both SCr/BUN levels elevated  - No history of vancomycin administration per the EPIC chart    Therefore, recommend the following;  1. Give vancomycin 1250 mg (20 mg/kg) IV x 1.  2. Will plan to obtain random vancomycin level tomorrow AM to guide future dosing    Pharmacy will continue to follow daily while on vancomycin and adjust as needed.     Charlotte Pearson [facility]    "

## 2017-04-04 NOTE — ED NOTES
Spoke with Dr. Vizcaino. Order given for mid-line cath. IV team paged.     Sb Maldonado RN  04/04/17 9080

## 2017-04-04 NOTE — ED PROVIDER NOTES
EMERGENCY DEPARTMENT ENCOUNTER    CHIEF COMPLAINT  Chief Complaint: Altered mental status  History given by: NH report, EMS  History limited by: Mental status change  Room Number: 21/21  PMD: Heath Velasco DO      HPI:  Per NH report, pt is a 84 y.o. female who presents after being found unresponsive this morning. Her hear rate was in the 80's on arrival. Per EMS, pt was acting appropriately around 4:30 AM today when evaluated by NH staff. When they rechecked the pt around 6:00 AM, pt was found in her current state. EMS states the pt was not maintaining her airway and had low sats, so pt was nasally intubated. Her blood sugar was 202. She is a full code    Duration: 1 hour  Onset: Unspecified  Timing: Constant  Location: N/A  Radiation: None  Quality: Unresponsive  Intensity/Severity: Severe  Progression: Unchanged  Associated Symptoms: None specified  Aggravating Factors: Nothing  Alleviating Factors: Nothing  Previous Episodes: None specified  Treatment before arrival: Intubated, IO line    PAST MEDICAL HISTORY  Active Ambulatory Problems     Diagnosis Date Noted   • Urinary tract infection 03/11/2017   • SUSIE (acute kidney injury) 03/11/2017   • Benign essential hypertension 12/13/2013   • Bradycardia 01/12/2017   • Cardiomyopathy 12/13/2013   • Stenosis of carotid artery 12/13/2013   • Diastolic dysfunction 12/13/2013   • Type 2 diabetes mellitus 12/13/2013   • Hyperlipidemia 12/13/2013   • Mitral valve insufficiency 12/13/2013   • Normocytic anemia 01/12/2017   • Open wound of thigh 01/12/2017   • Renal artery stenosis 12/13/2013   • Dementia 03/11/2017   • CKD (chronic kidney disease) stage 3, GFR 30-59 ml/min 03/14/2017     Resolved Ambulatory Problems     Diagnosis Date Noted   • Metabolic encephalopathy 03/11/2017     Past Medical History:   Diagnosis Date   • Bradycardia 1/12/2017   • Cardiomyopathy 12/13/2013   • Chronic kidney disease, stage 3    • COPD (chronic obstructive pulmonary disease)    •  Dementia 3/11/2017   • Diabetes mellitus    • Diastolic dysfunction 12/13/2013   • Hyperlipidemia 12/13/2013   • Hypertension    • Mitral valve insufficiency 12/13/2013   • Normocytic anemia 1/12/2017   • Open wound of thigh 1/12/2017   • Renal artery stenosis 12/13/2013   • Stenosis of carotid artery 12/13/2013   • Stroke    • TIA (transient ischemic attack)    • Type 2 diabetes mellitus 12/13/2013       PAST SURGICAL HISTORY  Past Surgical History:   Procedure Laterality Date   • BACK SURGERY         FAMILY HISTORY  Family History   Problem Relation Age of Onset   • Hypertension Other        SOCIAL HISTORY  Social History     Social History   • Marital status:      Spouse name: N/A   • Number of children: N/A   • Years of education: N/A     Occupational History   • Not on file.     Social History Main Topics   • Smoking status: Former Smoker     Packs/day: 1.00     Years: 15.00     Types: Cigarettes   • Smokeless tobacco: Not on file   • Alcohol use No   • Drug use: No   • Sexual activity: No     Other Topics Concern   • Not on file     Social History Narrative   • No narrative on file       ALLERGIES  Review of patient's allergies indicates no known allergies.    REVIEW OF SYSTEMS  Review of Systems   Unable to perform ROS: Mental status change   Neurological:        Positive for unresponsive       PHYSICAL EXAM  ED Triage Vitals   Temp Heart Rate Resp BP SpO2   04/04/17 0651 04/04/17 0651 04/04/17 0651 04/04/17 0657 --   94.9 °F (34.9 °C) 65 27 80/52       Temp src Heart Rate Source Patient Position BP Location FiO2 (%)   04/04/17 0651 -- -- -- --   Tympanic           Physical Exam   HENT:   Head: Atraumatic.   Pt is nasally intubated   Eyes:   Pt has pinpoint pupils   Neck: Normal range of motion.   Cardiovascular: Normal rate and regular rhythm.    Pulmonary/Chest: Effort normal. No respiratory distress.   Pt has coarse breath sounds bilaterally   Abdominal: Soft.   Musculoskeletal:   Pt has an IO in  her R shoulder   Neurological: She has normal sensation and normal strength.   Pt does not respond to painful stimuli   Skin: Skin is dry.   There is a large 12 x 5 cm wound on her R medial thigh. It is a deep wound with eschar formation inferiorly. Pt's extremities are cool.   Nursing note and vitals reviewed.      LAB RESULTS  Lab Results (last 24 hours)     Procedure Component Value Units Date/Time    Blood Gas, Arterial [[id number]]  (Abnormal) Collected:  04/04/17 0723    Specimen:  Arterial Blood Updated:  04/04/17 0727     Site Arterial: right brachial     Rodriguez's Test N/A     pH, Arterial 7.292 (C) pH units       Critical:Notify Dr gama serrano (04-Apr-17 07:26:57)Read back ok        pCO2, Arterial 26.4 (L) mm Hg      pO2, Arterial 562.3 (H) mm Hg      HCO3, Arterial 12.7 (L) mmol/L      Base Excess, Arterial -12.5 (L) mmol/L      O2 Saturation Calculated 100.0 (H) %      A-a Gradiant 0.8 mmHg      Barometric Pressure for Blood Gas 743.8 mmHg      Modality Adult Vent     FIO2 100 %      Ventilator Mode VC     Set Tidal Volume 550     Set Mech Resp Rate 12     Rate 17 Breaths/minute      PEEP 5    Narrative:       Meter: [id number] : [id number] Mouser Megan    Comprehensive Metabolic Panel [[id number]]  (Abnormal) Collected:  04/04/17 0733    Specimen:  Blood Updated:  04/04/17 0840     Glucose 178 (H) mg/dL      BUN 36 (H) mg/dL      Creatinine 2.46 (H) mg/dL      Sodium 148 (H) mmol/L      Potassium 5.3 (H) mmol/L      Chloride 114 (H) mmol/L      CO2 6.6 (L) mmol/L      Calcium 8.0 (L) mg/dL      Total Protein 5.4 (L) g/dL      Albumin 2.20 (L) g/dL      ALT (SGPT) 635 (H) U/L      AST (SGOT) 737 (H) U/L      Alkaline Phosphatase 115 U/L      Total Bilirubin 0.7 mg/dL      eGFR  African Amer 23 (L) mL/min/1.73      Globulin 3.2 gm/dL      A/G Ratio 0.7 g/dL      BUN/Creatinine Ratio 14.6     Anion Gap 27.4 mmol/L     Narrative:       The MDRD GFR formula is only valid for adults with stable renal  "function between ages 18 and 70.    Troponin [[id number]]  (Abnormal) Collected:  04/04/17 0733    Specimen:  Blood Updated:  04/04/17 0832     Troponin T 0.367 (C) ng/mL     Narrative:       Troponin T Reference Ranges:  Less than 0.03 ng/mL:    Negative for AMI  0.03 to 0.09 ng/mL:      Indeterminant for AMI  Greater than 0.09 ng/mL: Positive for AMI    Lactic Acid, Plasma [[id number]]  (Abnormal) Collected:  04/04/17 0733    Specimen:  Blood Updated:  04/04/17 0759     Lactate 9.1 (C) mmol/L     Procalcitonin [[id number]]  (Abnormal) Collected:  04/04/17 0733    Specimen:  Blood Updated:  04/04/17 0824     Procalcitonin 0.96 (C) ng/mL     Narrative:       As a Marker for Sepsis (Non-Neonates):   1. <0.5 ng/mL represents a low risk of severe sepsis and/or septic shock.  1. >2 ng/mL represents a high risk of severe sepsis and/or septic shock.    As a Marker for Lower Respiratory Tract Infections that require antibiotic therapy:  PCT on Admission     Antibiotic Therapy             6-12 Hrs later  > 0.5                Strongly Recommended            >0.25 - <0.5         Recommended  0.1 - 0.25           Discouraged                   Remeasure/reassess PCT  <0.1                 Strongly Discouraged          Remeasure/reassess PCT      As 28 day mortality risk marker: \"Change in Procalcitonin Result\" (> 80 % or <=80 %) if Day 0 (or Day 1) and Day 4 values are available. Refer to http://[email or web address]-pct-[email or web address]/   Change in PCT <=80 %   A decrease of PCT levels below or equal to 80 % defines a positive change in PCT test result representing a higher risk for 28-day all-cause mortality of patients diagnosed with severe sepsis or septic shock.  Change in PCT > 80 %   A decrease of PCT levels of more than 80 % defines a negative change in PCT result representing a lower risk for 28-day all-cause mortality of patients diagnosed with severe sepsis or septic shock.                BNP [[id number]]  (Abnormal) Collected:  04/04/17 " 0733    Specimen:  Blood Updated:  04/04/17 0754     proBNP 2096.0 (H) pg/mL     Narrative:       Among patients with dyspnea, NT-proBNP is highly sensitive for the detection of acute congestive heart failure. In addition NT-proBNP of <300 pg/ml effectively rules out acute congestive heart failure with 99% negative predictive value.    CBC & Differential [[id number]] Collected:  04/04/17 0806    Specimen:  Blood Updated:  04/04/17 0859    Narrative:       The following orders were created for panel order CBC & Differential.  Procedure                               Abnormality         Status                     ---------                               -----------         ------                     Scan Slide[[id number]]                                        Final result               CBC Auto Differential[[id number]]         Abnormal            Final result                 Please view results for these tests on the individual orders.    Blood Culture [[id number]] Collected:  04/04/17 0806    Specimen:  Blood from Arm, Right Updated:  04/04/17 0820    CBC Auto Differential [[id number]]  (Abnormal) Collected:  04/04/17 0806    Specimen:  Blood Updated:  04/04/17 0859     WBC 18.02 (H) 10*3/mm3      RBC 2.34 (L) 10*6/mm3      Hemoglobin 6.7 (C) g/dL      Hematocrit 23.7 (L) %      .3 (H) fL      MCH 28.6 pg      MCHC 28.3 (L) g/dL      RDW 16.3 (H) %      RDW-SD 60.1 (H) fl      MPV 9.5 fL      Platelets 469 10*3/mm3      Neutrophil % 84.0 (H) %      Lymphocyte % 6.5 (L) %      Monocyte % 3.8 (L) %      Eosinophil % 0.1 (L) %      Basophil % 0.1 %      Immature Grans % 5.5 (H) %      Neutrophils, Absolute 15.14 (H) 10*3/mm3      Lymphocytes, Absolute 1.17 10*3/mm3      Monocytes, Absolute 0.68 10*3/mm3      Eosinophils, Absolute 0.01 10*3/mm3      Basophils, Absolute 0.02 10*3/mm3      Immature Grans, Absolute 1.00 (H) 10*3/mm3      nRBC 0.1 (H) /100 WBC     Scan Slide [[id number]] Collected:  04/04/17 0806    Specimen:   Blood Updated:  04/04/17 0859     Crenated RBC's Mod/2+     Macrocytes Mod/2+     Schistocytes Slight/1+     WBC Morphology Normal     Clumped Platelets Present    Blood Culture [[id number]] Collected:  04/04/17 1033    Specimen:  Blood from Arm, Right Updated:  04/04/17 1040    Lactate Acid, Reflex [[id number]]  (Abnormal) Collected:  04/04/17 1034    Specimen:  Blood Updated:  04/04/17 1100     Lactate 6.8 (C) mmol/L           I ordered the above labs and reviewed the results    RADIOLOGY  XR Chest 1 View   Final Result      CT Head Without Contrast    (Results Pending)   CXR:  Endotracheal tube superimposing the trachea are column, tip is located 2.6 cm above the sona. There is satisfactory aeration of both lungs. Cardiac size within normal limits.     There is atherosclerotic calcification of the aorta, mediastinum is otherwise satisfactory in appearance.     Very subtle, 8 mm vague nodular focus superimposes the left lower lung zone, perhaps related to superimposition, recommend follow-up PA and lateral view of the chest when patient's condition permits. No consolidation, edema or effusion. No pneumothorax seen. The remainder is unremarkable.     I ordered the above noted radiological studies. Interpreted by radiologist. Discussed with radiologist. Reviewed by me in PACS.     EKG         EKG time: 7:44 AM  Rhythm/Rate: NSR at 69 bpm  P waves and AR: Normal  QRS, axis: Normal  ST and T waves: Nonspecific T wave changes  Interpreted contemporaneously by me and independently viewed.  Unchanged compared to prior (3/11/17).    PROCEDURES  Critical Care  Performed by: MADDY HESS  Authorized by: MADDY HESS   Total critical care time: 45 minutes  Critical care time was exclusive of separately billable procedures and treating other patients.  Critical care was necessary to treat or prevent imminent or life-threatening deterioration of the following conditions: sepsis, respiratory failure and renal  failure.  Critical care was time spent personally by me on the following activities: development of treatment plan with patient or surrogate, discussions with consultants, evaluation of patient's response to treatment, examination of patient, obtaining history from patient or surrogate, ordering and review of laboratory studies, ordering and review of radiographic studies, pulse oximetry, re-evaluation of patient's condition, ventilator management and review of old charts.            PROGRESS AND CONSULTS  ED Course   Value Comment By Time   Creatinine: (!) 2.46 0.89 on 3/14/17 Anthony Thomas MD 04/04 0841   6:59 AM:  Vitals: BP: (!) 80/52 HR: 65 Temp: 94.9 °F (34.9 °C) (Tympanic) O2 sat:    Will order labs, CXR, CT Head, and EKG for further evaluation. Ordered IVF for hydration, Zosyn for probable sepsis.    8:04 AM:  Vitals: BP: (!) 99/53 HR: 71 Temp: 94.9 °F (34.9 °C) (Tympanic) O2 sat: 100%  Rechecked pt. Status unchanged. Pt is still intubated and is not moving. Family is in the room, obtained additional history. Pt's antibiotics are starting and lab work has been sent. Discussed pt's case with family, they deny recent illness or abnormal behavior on behalf of the pt outside of mild fatigue several days ago. Family reports the pt's wound has grown in size since onset and that the pt has experienced two other similar episodes this year. Discussed the need for admission for further care, as the pt is very ill. Discussed pt's code status w/ family. She states the pt's wishes are to be given CPR, but not for a prolonged period of time. Will call Pulmonology for admission and order additional IVF for hydration. Pt's family understands and agrees with the plan, all questions answered.    8:10 AM:  Call placed to Pulmonology for admission.    8:43 AM:  Pt is anemic with Hemoglobin of 6.7. Ordered 2 units RBC for transfusion, Levophed for hypotension as her BP has dropped to 60/28.    8:55 AM:  Discussed pt's case  with Dr. Vizcaino (Intensivist), who agrees to admit the pt to the ICU for further care. Informed him that the pt need for definitive IV access.    MEDICAL DECISION MAKING  Results were reviewed/discussed with the patient and they were also made aware of online access. Pt also made aware that some labs, such as cultures, will not be resulted during ER visit and follow up with PMD is necessary.     MDM  Number of Diagnoses or Management Options  Acute kidney injury:   Acute respiratory failure, unspecified whether with hypoxia or hypercapnia:   Anemia, unspecified type:   Elevated LFTs:   Hypotension, unspecified hypotension type:   Open wound of thigh, right, initial encounter:   Sepsis, due to unspecified organism:   Diagnosis management comments: Patient presented to the ER after being found with decreased level of consciousness at the nursing home.  Patient was nasally intubated prior to arrival.  Patient was found to be hypotensive.  She was septic.  The likely source of this is a large wound on her right thigh.  Chest x-ray did not show any pneumonia.  Patient remained hypotensive in the ER even after several liters of normal saline.  She was started on a Levophed drip.  Patient was also found to be anemic.  2 units of packed red blood cells were ordered.  Her creatinine was elevated.  The patient is likely dehydrated.  Patient was started on IV Zosyn.  Case was discussed with Dr. Paz and he will admit the patient to ICU.  Critical care was performed on this patient.       Amount and/or Complexity of Data Reviewed  Clinical lab tests: reviewed and ordered (Lactic Acid 9.1, BNP 2000, Troponin 0.367, BUN 36, Creatinine 2.46, Sodium 148, Potassium 5.3, Hemoglobin 6.7, WBC 18)  Tests in the radiology section of CPT®: reviewed and ordered (CXR: No consolidation, edema, or effusion)  Tests in the medicine section of CPT®: reviewed and ordered (EKG time: 7:44 AM  Rhythm/Rate: NSR at 69 bpm  P waves and SD:  Normal  QRS, axis: Normal  ST and T waves: Nonspecific T wave changes  Interpreted contemporaneously by me and independently viewed.  Unchanged compared to prior (3/11/17).)  Discussion of test results with the performing providers: yes  Decide to obtain previous medical records or to obtain history from someone other than the patient: yes  Obtain history from someone other than the patient: yes (NH report, EMS)  Review and summarize past medical records: yes (Pt was admitted 3/11/17 to 3/15/17 secondary to chronic renal failure, dementia, metabolic encephalopathy, and R thigh wound. The thigh wound was treated with dakin's solution)  Discuss the patient with other providers: yes (Dr. Vizcaino (Intensivist))  Independent visualization of images, tracings, or specimens: yes    Critical Care  Total time providing critical care: 30-74 minutes    Patient Progress  Patient progress: stable         DIAGNOSIS  Final diagnoses:   Sepsis, due to unspecified organism   Hypotension, unspecified hypotension type   Acute kidney injury   Elevated LFTs   Anemia, unspecified type   Open wound of thigh, right, initial encounter   Acute respiratory failure, unspecified whether with hypoxia or hypercapnia       DISPOSITION  ADMISSION    Discussed treatment plan and reason for admission with pt/family and admitting physician.  Pt/family voiced understanding of the plan for admission for further testing/treatment as needed.     Latest Documented Vital Signs:  As of 12:45 PM  BP- 108/65 HR- 73 Temp- 94.5 °F (34.7 °C) (Rectal) O2 sat- 98%    --  Documentation assistance provided by jg Jones for Dr. Thomas.  Information recorded by the scribe was done at my direction and has been verified and validated by me.     Jame Jones  04/04/17 0900       Anthony Thomas MD  04/04/17 0627

## 2017-04-04 NOTE — NURSING NOTE
Brandon called again for PICC placement per Dr Vizcaino's order. Talked to Deepa  about no access for PICC and infiltration of previous iv in REEMA along with creat 2.4 and gfr 23 needing Renal approval.  going to call Dr Vizcaino. Called Sb RN to report Deepa was calling Dr Vizcaino and to call iv team with any further questions.

## 2017-04-04 NOTE — PROCEDURES
Central Venous Catheter Insertion Procedure Note      Indications:  vascular access    Procedure Details   Informed consent was obtained for the procedure, including sedation.  Risks of lung perforation, hemorrhage, arrhythmia, and adverse drug reaction were discussed.     Maximum sterile technique was used including usual patient drapes, antiseptics and physician sterile garments.    Under sterile conditions the skin above the on the right internal jugular vein was prepped with Chlorhexidine and covered with a sterile drape. Local anesthesia was applied to the skin and subcutaneous tissues with lidocaine 1%. Under ultrasound guidance an 18-gauge needle was then inserted into the vein. A guide wire was then passed easily through the catheter. A triple-lumen was then inserted into the vessel over the guide wire. The catheter was sutured into place and dressed following sterile protocol with Biopatch placed.    Findings:  There were no changes to vital signs. All catheter ports were flushed with saline. Patient did tolerate procedure well.    Recommendations:  CXR ordered to verify placement.     Gus Vizcaino MD  4/4/2017

## 2017-04-04 NOTE — PROGRESS NOTES
Patient had ventricular ectopy on the monitor.   EKG was obtained and showed concave ST elevation in leads V2 to V6. There's QS in leads V1 to V3. These findings are new compared to prior EKG earlier today.   Trop was noted to be rising. K=5.2; Cr increasing to 2.8.     EKG      Patient is septic, with limb ischemia on 3 pressors and the above findings likely represent ischemia form pressors/hypotension.   Will get cardiology on board for prognostication. Note that, she's anuric and her Cr is increasing.    Overall she has extremely poor prognosis. Family contacted.

## 2017-04-07 LAB
BACTERIA SPEC RESP CULT: NORMAL
GRAM STN SPEC: NORMAL
GRAM STN SPEC: NORMAL

## 2017-04-08 LAB
BACTERIA SPEC AEROBE CULT: ABNORMAL
BACTERIA SPEC AEROBE CULT: ABNORMAL
GRAM STN SPEC: ABNORMAL
GRAM STN SPEC: ABNORMAL

## 2017-04-09 LAB
BACTERIA SPEC AEROBE CULT: NORMAL
BACTERIA SPEC AEROBE CULT: NORMAL

## 2017-04-29 NOTE — DISCHARGE SUMMARY
Final diagnosis:  Respiratory failure on ventilator  Lactic acidosis  Sepsis  Altered mental status/metabolic encephalopathy  Right thigh open wound  Elevated troponin  Hyperkalemia  Acute on chronic kidney disease  Anemia  History cardiomyopathy  Diabetes mellitus  Baseline dementia  Elevated LFTs      See H&P for admission details:  Chief Complaint: Unresponsive at nursing home     History of Present Illness:   84-year-old female presents after being found unresponsive this morning by nursing home staff. Per verbal report and discussion with the emergency room staff saturations upon arrival by EMS were 50%. Patient was nasally intubated in route. Was recently in the hospital under hospitalist service. Discharged on March 15. Has an open wound to the right thigh which has been chronic but apparently getting worse. Patient is in the ER currently on the ventilator on Levophed. Noted to have decreased hemoglobin and packed red blood cells have been ordered. Unable to obtain any history from patient and no current family is available. Has baseline dementia but apparently is able to ambulate with assistance of a walker. Elevated lactic acid level noted and patient started on antibiotics for sepsis. Blood, urine and sputum being sent for culture analysis.    Admit with:     Assessment:  Respiratory failure on ventilator  Lactic acidosis  Sepsis  Altered mental status/metabolic encephalopathy  Right thigh open wound  Elevated troponin  Hyperkalemia  Acute on chronic kidney disease  Anemia  History cardiomyopathy  Diabetes mellitus  Baseline dementia  Elevated LFTs    Chart reviewed.  Very little else for review.  I saw this pt on admission and did H&P and later placed a central line for iv access but did not have further interaction.  Notes suggest that family was considering palliative care and comfort only measures.

## 2018-09-22 NOTE — PLAN OF CARE
Problem: Patient Care Overview (Adult)  Goal: Plan of Care Review  Outcome: Ongoing (interventions implemented as appropriate)    03/13/17 0325   Coping/Psychosocial Response Interventions   Plan Of Care Reviewed With patient   Patient Care Overview   Progress no change   Outcome Evaluation   Outcome Summary/Follow up Plan no s/s of acute distress noted. will continue to monitor pt.        Goal: Adult Individualization and Mutuality  Outcome: Ongoing (interventions implemented as appropriate)  Goal: Discharge Needs Assessment  Outcome: Ongoing (interventions implemented as appropriate)    Problem: Skin Integrity Impairment, Risk/Actual (Adult)  Goal: Identify Related Risk Factors and Signs and Symptoms  Outcome: Ongoing (interventions implemented as appropriate)  Goal: Skin Integrity/Wound Healing  Outcome: Ongoing (interventions implemented as appropriate)    Problem: Fall Risk (Adult)  Goal: Identify Related Risk Factors and Signs and Symptoms  Outcome: Ongoing (interventions implemented as appropriate)  Goal: Absence of Falls  Outcome: Ongoing (interventions implemented as appropriate)       5

## 2019-06-06 NOTE — PROGRESS NOTES
Continued Stay Note  [facility]     Patient Name: Carley Madden  MRN: [id number]  Today's Date: 3/15/2017    Admit Date: 3/11/2017          Discharge Plan       03/15/17 1130    Case Management/Social Work Plan    Plan [facility] vs. [facility]    Patient/Family In Agreement With Plan yes    Additional Comments S/W Gemma/[facility] and she states that patient came from a skilled/Medicaid bed and will have a bed at d/c, if she choses to go to [facility] instead of [facility].  Waiting on notification from daughterYunier on what Malcolm has said, re: insurance.  JEAN MARIE Camara, CCP      03/15/17 0859    Case Management/Social Work Plan    Plan [facility] vs. another SNF    Patient/Family In Agreement With Plan yes    Additional Comments S/W daughterYunier and she states that her mother's insurance will be cancelled on April 1st, and [facility] does not take the new insurance that she will be changing to.  Yunier has been s/w Rachell/Robertwmarquise and is waiting to hear from her to see if it will be ok for mother to stay at [facility] until April 1st and then go straight Medicaid? S/w Minerva/Malcolm to inform of situation and will await return call.  JEAN MARIE Camara, CCP                Discharge Codes     None            Arminda Armendariz RN     Additional Notes (Optional): Patient states enlarging and bothersome. Discussed removal options, Liquid Nitrogen (LN2) vs Eskata vs shave removal. Expressed that Liquid Nitrogen (LN2) and shave removal may be considered cosmetic and that the fee is PATOS. Patient aware Eskata is considered cosmetic and is not covered by insurance. Green cosmetic pricing sheet given Hide Additional Notes?: No Detail Level: Detailed